# Patient Record
Sex: FEMALE | Race: BLACK OR AFRICAN AMERICAN | NOT HISPANIC OR LATINO | URBAN - METROPOLITAN AREA
[De-identification: names, ages, dates, MRNs, and addresses within clinical notes are randomized per-mention and may not be internally consistent; named-entity substitution may affect disease eponyms.]

---

## 2020-08-06 ENCOUNTER — INPATIENT (INPATIENT)
Facility: HOSPITAL | Age: 35
LOS: 0 days | Discharge: HOME | End: 2020-08-07
Attending: SPECIALIST | Admitting: SPECIALIST
Payer: MEDICAID

## 2020-08-06 VITALS
SYSTOLIC BLOOD PRESSURE: 173 MMHG | DIASTOLIC BLOOD PRESSURE: 101 MMHG | RESPIRATION RATE: 16 BRPM | TEMPERATURE: 99 F | HEART RATE: 99 BPM

## 2020-08-06 DIAGNOSIS — Z3A.00 WEEKS OF GESTATION OF PREGNANCY NOT SPECIFIED: ICD-10-CM

## 2020-08-06 DIAGNOSIS — O16.3 UNSPECIFIED MATERNAL HYPERTENSION, THIRD TRIMESTER: ICD-10-CM

## 2020-08-06 DIAGNOSIS — Z98.890 OTHER SPECIFIED POSTPROCEDURAL STATES: Chronic | ICD-10-CM

## 2020-08-06 DIAGNOSIS — O26.899 OTHER SPECIFIED PREGNANCY RELATED CONDITIONS, UNSPECIFIED TRIMESTER: ICD-10-CM

## 2020-08-06 LAB
ALBUMIN SERPL ELPH-MCNC: 4 G/DL — SIGNIFICANT CHANGE UP (ref 3.3–5)
ALP SERPL-CCNC: 121 U/L — HIGH (ref 40–120)
ALT FLD-CCNC: 7 U/L — SIGNIFICANT CHANGE UP (ref 4–33)
AMPHET UR-MCNC: NEGATIVE — SIGNIFICANT CHANGE UP
ANION GAP SERPL CALC-SCNC: 15 MMO/L — HIGH (ref 7–14)
APTT BLD: 24.8 SEC — LOW (ref 27–36.3)
AST SERPL-CCNC: 10 U/L — SIGNIFICANT CHANGE UP (ref 4–32)
BARBITURATES UR SCN-MCNC: NEGATIVE — SIGNIFICANT CHANGE UP
BASOPHILS # BLD AUTO: 0.02 K/UL — SIGNIFICANT CHANGE UP (ref 0–0.2)
BASOPHILS NFR BLD AUTO: 0.2 % — SIGNIFICANT CHANGE UP (ref 0–2)
BENZODIAZ UR-MCNC: NEGATIVE — SIGNIFICANT CHANGE UP
BILIRUB SERPL-MCNC: < 0.2 MG/DL — LOW (ref 0.2–1.2)
BLD GP AB SCN SERPL QL: NEGATIVE — SIGNIFICANT CHANGE UP
BUN SERPL-MCNC: 9 MG/DL — SIGNIFICANT CHANGE UP (ref 7–23)
CALCIUM SERPL-MCNC: 8.9 MG/DL — SIGNIFICANT CHANGE UP (ref 8.4–10.5)
CANNABINOIDS UR-MCNC: NEGATIVE — SIGNIFICANT CHANGE UP
CHLORIDE SERPL-SCNC: 104 MMOL/L — SIGNIFICANT CHANGE UP (ref 98–107)
CO2 SERPL-SCNC: 18 MMOL/L — LOW (ref 22–31)
COCAINE METAB.OTHER UR-MCNC: NEGATIVE — SIGNIFICANT CHANGE UP
CREAT SERPL-MCNC: 0.5 MG/DL — SIGNIFICANT CHANGE UP (ref 0.5–1.3)
EOSINOPHIL # BLD AUTO: 0.07 K/UL — SIGNIFICANT CHANGE UP (ref 0–0.5)
EOSINOPHIL NFR BLD AUTO: 0.7 % — SIGNIFICANT CHANGE UP (ref 0–6)
FIBRINOGEN PPP-MCNC: 667 MG/DL — HIGH (ref 290–520)
GLUCOSE SERPL-MCNC: 95 MG/DL — SIGNIFICANT CHANGE UP (ref 70–99)
HBV SURFACE AG SER-ACNC: NEGATIVE — SIGNIFICANT CHANGE UP
HCT VFR BLD CALC: 30.6 % — LOW (ref 34.5–45)
HGB BLD-MCNC: 10.2 G/DL — LOW (ref 11.5–15.5)
HIV COMBO RESULT: SIGNIFICANT CHANGE UP
HIV1+2 AB SPEC QL: SIGNIFICANT CHANGE UP
IMM GRANULOCYTES NFR BLD AUTO: 0.4 % — SIGNIFICANT CHANGE UP (ref 0–1.5)
INR BLD: 0.98 — SIGNIFICANT CHANGE UP (ref 0.88–1.16)
LDH SERPL L TO P-CCNC: 135 U/L — SIGNIFICANT CHANGE UP (ref 135–225)
LYMPHOCYTES # BLD AUTO: 1.98 K/UL — SIGNIFICANT CHANGE UP (ref 1–3.3)
LYMPHOCYTES # BLD AUTO: 20.6 % — SIGNIFICANT CHANGE UP (ref 13–44)
MCHC RBC-ENTMCNC: 31 PG — SIGNIFICANT CHANGE UP (ref 27–34)
MCHC RBC-ENTMCNC: 33.3 % — SIGNIFICANT CHANGE UP (ref 32–36)
MCV RBC AUTO: 93 FL — SIGNIFICANT CHANGE UP (ref 80–100)
METHADONE UR-MCNC: NEGATIVE — SIGNIFICANT CHANGE UP
MONOCYTES # BLD AUTO: 0.66 K/UL — SIGNIFICANT CHANGE UP (ref 0–0.9)
MONOCYTES NFR BLD AUTO: 6.9 % — SIGNIFICANT CHANGE UP (ref 2–14)
NEUTROPHILS # BLD AUTO: 6.85 K/UL — SIGNIFICANT CHANGE UP (ref 1.8–7.4)
NEUTROPHILS NFR BLD AUTO: 71.2 % — SIGNIFICANT CHANGE UP (ref 43–77)
NRBC # FLD: 0 K/UL — SIGNIFICANT CHANGE UP (ref 0–0)
OPIATES UR-MCNC: NEGATIVE — SIGNIFICANT CHANGE UP
OXYCODONE UR-MCNC: NEGATIVE — SIGNIFICANT CHANGE UP
PCP UR-MCNC: NEGATIVE — SIGNIFICANT CHANGE UP
PLATELET # BLD AUTO: 269 K/UL — SIGNIFICANT CHANGE UP (ref 150–400)
PMV BLD: 9 FL — SIGNIFICANT CHANGE UP (ref 7–13)
POTASSIUM SERPL-MCNC: 3.9 MMOL/L — SIGNIFICANT CHANGE UP (ref 3.5–5.3)
POTASSIUM SERPL-SCNC: 3.9 MMOL/L — SIGNIFICANT CHANGE UP (ref 3.5–5.3)
PROT SERPL-MCNC: 7.2 G/DL — SIGNIFICANT CHANGE UP (ref 6–8.3)
PROTHROM AB SERPL-ACNC: 11.2 SEC — SIGNIFICANT CHANGE UP (ref 10.6–13.6)
RBC # BLD: 3.29 M/UL — LOW (ref 3.8–5.2)
RBC # FLD: 14.1 % — SIGNIFICANT CHANGE UP (ref 10.3–14.5)
RH IG SCN BLD-IMP: POSITIVE — SIGNIFICANT CHANGE UP
RH IG SCN BLD-IMP: POSITIVE — SIGNIFICANT CHANGE UP
RUBV IGG SER-ACNC: 9.2 INDEX — SIGNIFICANT CHANGE UP
RUBV IGG SER-IMP: POSITIVE — SIGNIFICANT CHANGE UP
SARS-COV-2 RNA SPEC QL NAA+PROBE: SIGNIFICANT CHANGE UP
SODIUM SERPL-SCNC: 137 MMOL/L — SIGNIFICANT CHANGE UP (ref 135–145)
T PALLIDUM AB TITR SER: NEGATIVE — SIGNIFICANT CHANGE UP
URATE SERPL-MCNC: 5.6 MG/DL — SIGNIFICANT CHANGE UP (ref 2.5–7)
WBC # BLD: 9.62 K/UL — SIGNIFICANT CHANGE UP (ref 3.8–10.5)
WBC # FLD AUTO: 9.62 K/UL — SIGNIFICANT CHANGE UP (ref 3.8–10.5)

## 2020-08-06 PROCEDURE — 59409 OBSTETRICAL CARE: CPT | Mod: U9

## 2020-08-06 RX ORDER — OXYCODONE HYDROCHLORIDE 5 MG/1
5 TABLET ORAL
Refills: 0 | Status: DISCONTINUED | OUTPATIENT
Start: 2020-08-06 | End: 2020-08-07

## 2020-08-06 RX ORDER — OXYCODONE HYDROCHLORIDE 5 MG/1
5 TABLET ORAL ONCE
Refills: 0 | Status: DISCONTINUED | OUTPATIENT
Start: 2020-08-06 | End: 2020-08-07

## 2020-08-06 RX ORDER — DIPHENHYDRAMINE HCL 50 MG
25 CAPSULE ORAL EVERY 6 HOURS
Refills: 0 | Status: DISCONTINUED | OUTPATIENT
Start: 2020-08-06 | End: 2020-08-07

## 2020-08-06 RX ORDER — IBUPROFEN 200 MG
600 TABLET ORAL EVERY 6 HOURS
Refills: 0 | Status: COMPLETED | OUTPATIENT
Start: 2020-08-06 | End: 2021-07-05

## 2020-08-06 RX ORDER — ACETAMINOPHEN 500 MG
975 TABLET ORAL
Refills: 0 | Status: DISCONTINUED | OUTPATIENT
Start: 2020-08-06 | End: 2020-08-07

## 2020-08-06 RX ORDER — TETANUS TOXOID, REDUCED DIPHTHERIA TOXOID AND ACELLULAR PERTUSSIS VACCINE, ADSORBED 5; 2.5; 8; 8; 2.5 [IU]/.5ML; [IU]/.5ML; UG/.5ML; UG/.5ML; UG/.5ML
0.5 SUSPENSION INTRAMUSCULAR ONCE
Refills: 0 | Status: DISCONTINUED | OUTPATIENT
Start: 2020-08-06 | End: 2020-08-07

## 2020-08-06 RX ORDER — MAGNESIUM HYDROXIDE 400 MG/1
30 TABLET, CHEWABLE ORAL
Refills: 0 | Status: DISCONTINUED | OUTPATIENT
Start: 2020-08-06 | End: 2020-08-07

## 2020-08-06 RX ORDER — KETOROLAC TROMETHAMINE 30 MG/ML
30 SYRINGE (ML) INJECTION ONCE
Refills: 0 | Status: DISCONTINUED | OUTPATIENT
Start: 2020-08-06 | End: 2020-08-06

## 2020-08-06 RX ORDER — BENZOCAINE 10 %
1 GEL (GRAM) MUCOUS MEMBRANE EVERY 6 HOURS
Refills: 0 | Status: DISCONTINUED | OUTPATIENT
Start: 2020-08-06 | End: 2020-08-07

## 2020-08-06 RX ORDER — SIMETHICONE 80 MG/1
80 TABLET, CHEWABLE ORAL EVERY 4 HOURS
Refills: 0 | Status: DISCONTINUED | OUTPATIENT
Start: 2020-08-06 | End: 2020-08-07

## 2020-08-06 RX ORDER — DIBUCAINE 1 %
1 OINTMENT (GRAM) RECTAL EVERY 6 HOURS
Refills: 0 | Status: DISCONTINUED | OUTPATIENT
Start: 2020-08-06 | End: 2020-08-07

## 2020-08-06 RX ORDER — SODIUM CHLORIDE 9 MG/ML
3 INJECTION INTRAMUSCULAR; INTRAVENOUS; SUBCUTANEOUS EVERY 8 HOURS
Refills: 0 | Status: DISCONTINUED | OUTPATIENT
Start: 2020-08-06 | End: 2020-08-07

## 2020-08-06 RX ORDER — OXYTOCIN 10 UNIT/ML
333.33 VIAL (ML) INJECTION
Qty: 20 | Refills: 0 | Status: DISCONTINUED | OUTPATIENT
Start: 2020-08-06 | End: 2020-08-07

## 2020-08-06 RX ORDER — NIFEDIPINE 30 MG
30 TABLET, EXTENDED RELEASE 24 HR ORAL DAILY
Refills: 0 | Status: DISCONTINUED | OUTPATIENT
Start: 2020-08-06 | End: 2020-08-07

## 2020-08-06 RX ORDER — HYDROCORTISONE 1 %
1 OINTMENT (GRAM) TOPICAL EVERY 6 HOURS
Refills: 0 | Status: DISCONTINUED | OUTPATIENT
Start: 2020-08-06 | End: 2020-08-07

## 2020-08-06 RX ORDER — OXYTOCIN 10 UNIT/ML
333.33 VIAL (ML) INJECTION
Qty: 20 | Refills: 0 | Status: DISCONTINUED | OUTPATIENT
Start: 2020-08-06 | End: 2020-08-06

## 2020-08-06 RX ORDER — PRAMOXINE HYDROCHLORIDE 150 MG/15G
1 AEROSOL, FOAM RECTAL EVERY 4 HOURS
Refills: 0 | Status: DISCONTINUED | OUTPATIENT
Start: 2020-08-06 | End: 2020-08-07

## 2020-08-06 RX ORDER — LANOLIN
1 OINTMENT (GRAM) TOPICAL EVERY 6 HOURS
Refills: 0 | Status: DISCONTINUED | OUTPATIENT
Start: 2020-08-06 | End: 2020-08-07

## 2020-08-06 RX ORDER — AER TRAVELER 0.5 G/1
1 SOLUTION RECTAL; TOPICAL EVERY 4 HOURS
Refills: 0 | Status: DISCONTINUED | OUTPATIENT
Start: 2020-08-06 | End: 2020-08-07

## 2020-08-06 RX ORDER — LABETALOL HCL 100 MG
20 TABLET ORAL ONCE
Refills: 0 | Status: DISCONTINUED | OUTPATIENT
Start: 2020-08-06 | End: 2020-08-06

## 2020-08-06 RX ORDER — SODIUM CHLORIDE 9 MG/ML
1000 INJECTION, SOLUTION INTRAVENOUS
Refills: 0 | Status: DISCONTINUED | OUTPATIENT
Start: 2020-08-06 | End: 2020-08-06

## 2020-08-06 RX ADMIN — Medication 30 MILLIGRAM(S): at 09:52

## 2020-08-06 RX ADMIN — SODIUM CHLORIDE 125 MILLILITER(S): 9 INJECTION, SOLUTION INTRAVENOUS at 09:53

## 2020-08-06 RX ADMIN — Medication 30 MILLIGRAM(S): at 17:06

## 2020-08-06 NOTE — OB RN PATIENT PROFILE - WEIGHT: PREPREGNANCY IN LBS
"Initial Temp 97.8  F (36.6  C) (Tympanic)  Ht 2' 8.75\" (0.832 m)  Wt 25 lb 13.5 oz (11.7 kg)  HC 19\" (48.3 cm)  BMI 16.94 kg/m2 Estimated body mass index is 16.94 kg/(m^2) as calculated from the following:    Height as of this encounter: 2' 8.75\" (0.832 m).    Weight as of this encounter: 25 lb 13.5 oz (11.7 kg). .    Kaylyn Ang, JAROD    " 160

## 2020-08-06 NOTE — OB PROVIDER H&P - ALERT: PERTINENT HISTORY
Fetal Non-Stress Test (NST)/Follow up Sonogram for Growth/1st Trimester Sonogram/20 Week Level II Sonogram

## 2020-08-06 NOTE — OB RN TRIAGE NOTE - GRAVIDA, OB PROFILE
Notified patient that script is ready to  at [facility].  
Ok to refill  
Patient at [facility], [place].  Script not found.  Patient spoke with Peterson and was informed she will call him when it is ready.  
Patient picked up: Script.   Identity was verified: Yes.     
Patient stated he will be at the clinic tomorrow at 9:30 am. He would like to  the prescription at that time.   
Patient will come to [facility] [place] office to : prescription.   Patient was advised of location and hours: Yes.   Patient was advised to bring photo identification: Yes.   Patient elects another party to  item: no.    Patient stated that he has 5 days worth and he always has to  the prescription. Please call once ready to .     
Refill request for oxycodone.  Last seen 4/1/19;  Last filled 8/20/19.     PDMP reviewed and shows med was last dispensed on 8/20/19. No suspicious behavior found. Routed to Keri to advise please.      
Rx was printed on 9/23 at 4:57, but no Rx found by computer or out at the .    Peterson or Dr. Saavedra do you know where script is?  
Script ready  
4

## 2020-08-06 NOTE — OB RN DELIVERY SUMMARY - NS_SEPSISRSKCALC_OBGYN_ALL_OB_FT
EOS calculated successfully. EOS Risk Factor: 0.5/1000 live births ([facility] national incidence); GA=39w3d; Temp=99; ROM=0.933; GBS='Unknown'; Antibiotics='No antibiotics or any antibiotics < 2 hrs prior to birth'

## 2020-08-06 NOTE — OB PROVIDER TRIAGE NOTE - NSHPPHYSICALEXAM_GEN_ALL_CORE
Vital Signs Last 24 Hrs  T(C): 37.2 (06 Aug 2020 08:45), Max: 37.2 (06 Aug 2020 08:45)  T(F): 99 (06 Aug 2020 08:45), Max: 99 (06 Aug 2020 08:45)  HR: 88 (06 Aug 2020 09:00) (88 - 99)  BP: 152/90 (06 Aug 2020 09:00) (152/90 - 173/101)  BP(mean): --  RR: 16 (06 Aug 2020 08:45) (16 - 16)  SpO2: 100% (06 Aug 2020 09:03) (99% - 100%)    A&O x 3  CTAB  normal RRR  abdomen: gravid, soft, nontender, good relaxation between contractions  sve 6/100/-2 bulging bag  TAS: vtx confirmed

## 2020-08-06 NOTE — CHART NOTE - NSCHARTNOTEFT_GEN_A_CORE
R1 Progress Note    Patient seen and examined at bedside for AROM. Clear fluid.     NAD  Vital Signs Last 24 Hrs  T(C): 37.0 (06 Aug 2020 11:04), Max: 37.2 (06 Aug 2020 08:45)  T(F): 98.6 (06 Aug 2020 11:04), Max: 99 (06 Aug 2020 08:45)  HR: 109 (06 Aug 2020 13:23) (65 - 111)  BP: 177/84 (06 Aug 2020 13:23) (126/89 - 177/84)  BP(mean): --  RR: 16 (06 Aug 2020 09:14) (16 - 16)  SpO2: 99% (06 Aug 2020 09:18) (97% - 100%)    SVE: 8/100/-1  EFM: cat I   TOCO: q2    A/P 34y   - labor: expectant management  - fetus: cat 1 FHT  - gbs: neg  - pain: no complaints    d/w Dr. Rowan, PGY4  Rosalind Reis MD PGY1

## 2020-08-06 NOTE — OB PROVIDER DELIVERY SUMMARY - NSPROVIDERDELIVERYNOTE_OBGYN_ALL_OB_FT
Spontaneous vaginal delivery of liveborn female infant from JOE position. Head, shoulders, and body delivered easily. Infant was suctioned. No mec. Delayed cord clamping.  Cord was clamped and cut. Placenta delivered intact with a 3 vessel cord. Infant handed to mom. Fundal massage was given and uterine fundus was found to be firm. Vaginal exam revealed an intact cervix, vaginal walls and sulci. Patient had a 2nd degree laceration in the perineum that was repaired with 2.0 chromic suture. Excellent hemostasis was noted. Patient was stable and went to recovery. Count was correct x 2.

## 2020-08-06 NOTE — OB PROVIDER H&P - HISTORY OF PRESENT ILLNESS
This is a 34 year old  patient of [facility]  at 39.3 weeks gestational age presents with complaints of contractions q 1.5-5 minutes. Does not require pain management at this time. Reports +GFM, denies LOF, VB. denies headache, blurry vision, epigastric pain, nausea, vomiting  AP course complicated by:  - chtn diagnosed at 5 months GA, on procardia 30 mg/ daily, last dose 830a yesterday    GBS neg as per patient    med: chtn  surg: D&C  GYN: fibroids  OB:  2004 ft  complicated by chtn 7#4  2011 ft  complicated by chtn 6#7  top x1 D&C  NKDA  current meds: pnv, procardia 30 mg daily

## 2020-08-06 NOTE — OB PROVIDER H&P - PRO HIV INFANT
Agree w History and Physical, History of Present Illness, Allergies/Medications, Patient History, Risk Assessment, Physical Exam, Labs and Results, Assessment and Plan
unknown

## 2020-08-06 NOTE — OB PROVIDER TRIAGE NOTE - NSOBPROVIDERNOTE_OBGYN_ALL_OB_FT
This is a 34 year old  at 39.3 weeks gestational age admitted for labor and chtn/ r.o PEC    plan discussed with dr grayson, dr levine  admit for labor  routine orders + expedited labs  COVID 19 swab  HELLP labs  Continue procardia 30 mg po daily  approved for epidural prn  expectant management

## 2020-08-06 NOTE — OB RN PATIENT PROFILE - PSH
· 2 RN skin check complete with HEBERT Franklin.  · Devices in place Silicone nasal cannula.  · Skin assessed under devices assessed.  · Confirmed pressure ulcers found on n/a.  · New potential pressure ulcers noted on n/a.   · The following interventions in place grey foam pads, moisturizer on feet    BL feet/heels dry/caloused/flaky     History of D&C  x1

## 2020-08-06 NOTE — OB PROVIDER TRIAGE NOTE - NSHPLABSRESULTS_GEN_ALL_CORE
This is a 34 year old  at 39.3 weeks gestational age admitted for labor and chtn/ r.o PEC    plan discussed with dr muhammad for labor  routine orders + expidited labs  COVID 19 swab  HELLP labs  Continue procardia 30 mg po daily  approved for epidural prn  expectant management

## 2020-08-06 NOTE — OB RN TRIAGE NOTE - PMH
Vaginal delivery   04 7#4 CHTN    11 6#7 CHTN Termination of pregnancy (fetus)  X1  Vaginal delivery   04 7#4 CHTN    11 6#7 CHTN

## 2020-08-06 NOTE — OB RN PATIENT PROFILE - HEIGHT IN CM
Telephone Encounter by Rena Nelson [phone]

## 2020-08-07 ENCOUNTER — TRANSCRIPTION ENCOUNTER (OUTPATIENT)
Age: 35
End: 2020-08-07

## 2020-08-07 VITALS
TEMPERATURE: 98 F | RESPIRATION RATE: 18 BRPM | DIASTOLIC BLOOD PRESSURE: 75 MMHG | OXYGEN SATURATION: 100 % | SYSTOLIC BLOOD PRESSURE: 120 MMHG | HEART RATE: 74 BPM

## 2020-08-07 RX ORDER — IBUPROFEN 200 MG
600 TABLET ORAL EVERY 6 HOURS
Refills: 0 | Status: DISCONTINUED | OUTPATIENT
Start: 2020-08-07 | End: 2020-08-07

## 2020-08-07 RX ORDER — NIFEDIPINE 30 MG
1 TABLET, EXTENDED RELEASE 24 HR ORAL
Qty: 0 | Refills: 0 | DISCHARGE
Start: 2020-08-07

## 2020-08-07 RX ORDER — NIFEDIPINE 30 MG
1 TABLET, EXTENDED RELEASE 24 HR ORAL
Qty: 60 | Refills: 1
Start: 2020-08-07 | End: 2020-12-04

## 2020-08-07 RX ORDER — NIFEDIPINE 30 MG
0 TABLET, EXTENDED RELEASE 24 HR ORAL
Qty: 0 | Refills: 0 | DISCHARGE

## 2020-08-07 RX ORDER — ACETAMINOPHEN 500 MG
3 TABLET ORAL
Qty: 0 | Refills: 0 | DISCHARGE
Start: 2020-08-07

## 2020-08-07 RX ADMIN — SODIUM CHLORIDE 3 MILLILITER(S): 9 INJECTION INTRAMUSCULAR; INTRAVENOUS; SUBCUTANEOUS at 06:07

## 2020-08-07 RX ADMIN — Medication 975 MILLIGRAM(S): at 00:31

## 2020-08-07 RX ADMIN — Medication 30 MILLIGRAM(S): at 09:28

## 2020-08-07 RX ADMIN — Medication 975 MILLIGRAM(S): at 01:00

## 2020-08-07 NOTE — DISCHARGE NOTE OB - PATIENT PORTAL LINK FT
You can access the FollowMyHealth Patient Portal offered by [facility] by registering at the following website: [email or web address]. By joining [facility]’s FollowMyHealth portal, you will also be able to view your health information using other applications (apps) compatible with our system.

## 2020-08-07 NOTE — DISCHARGE NOTE OB - MEDICATION SUMMARY - MEDICATIONS TO TAKE
I will START or STAY ON the medications listed below when I get home from the hospital:    BP cuff  -- Apply on skin to affected area 3 times a day   -- Indication: For BP monitoring    acetaminophen 325 mg oral tablet  -- 3 tab(s) by mouth , As Needed  -- Indication: For Pain     Prenatal Multivitamins with Folic Acid 1 mg oral tablet  -- 1 tab(s) by mouth once a day  -- Indication: For Vitamins I will START or STAY ON the medications listed below when I get home from the hospital:    BP cuff  -- Apply on skin to affected area 3 times a day   -- Indication: For BP    acetaminophen 325 mg oral tablet  -- 3 tab(s) by mouth , As Needed  -- Indication: For Pain     Prenatal Multivitamins with Folic Acid 1 mg oral tablet  -- 1 tab(s) by mouth once a day  -- Indication: For Vitamins I will START or STAY ON the medications listed below when I get home from the hospital:    BP cuff  -- Apply on skin to affected area 3 times a day   -- Indication: For BP    acetaminophen 325 mg oral tablet  -- 3 tab(s) by mouth , As Needed  -- Indication: For Pain     NIFEdipine 30 mg oral tablet, extended release  -- 1 tab(s) by mouth once a day  -- Indication: For Hypertension affecting pregnancy in third trimester    Prenatal Multivitamins with Folic Acid 1 mg oral tablet  -- 1 tab(s) by mouth once a day  -- Indication: For Vitamins

## 2020-08-07 NOTE — PROGRESS NOTE ADULT - ATTENDING COMMENTS
I examined and evaluated the patient  I discussed the patient with the resident and agree with the resident's documentation with the following edits and additions:    33yo s/p NVD. PPD 1  H/o cHTN  Patient denies HA, blurry vision, RUQ pain  BPs controlled on Procardia 30mg XL    -Continue to monitor BPS  If remains stable can be discharged 24hours post delivery    JAZZ Teixeira MD, TIA, FACOG

## 2020-08-07 NOTE — DISCHARGE NOTE OB - COMMUNITY RESOURCE NAME:
Patient she will call to schedule postpartum follow up appointment for 4 to 6 weeks after delivery date at [facility] OB clinic [phone] Patient will call to schedule postpartum follow up appointment for 4 to 6 weeks after delivery date at [facility] OB clinic [phone]

## 2020-08-07 NOTE — DISCHARGE NOTE OB - MEDICATION SUMMARY - MEDICATIONS TO STOP TAKING
I will STOP taking the medications listed below when I get home from the hospital:  None I will STOP taking the medications listed below when I get home from the hospital:    Prenatal 1

## 2020-08-07 NOTE — LACTATION INITIAL EVALUATION - LACTATION INTERVENTIONS
initiate hand expression routine/initiate skin to skin/Instructed and assisted with positioning to facilitate proper latch.  Encouraged to feed on cue and follow the feeding log, reviewed.  Taught hand expression.  Discussed outpatient resources available, warm line, virtual breastfeeding support group.

## 2020-08-07 NOTE — DISCHARGE NOTE OB - CARE PROVIDER_API CALL
[facility], Oncology Basement  [address] [place] [address]  Phone: [phone]  Fax: (   )    -  Follow Up Time:

## 2020-08-07 NOTE — DISCHARGE NOTE OB - HOSPITAL COURSE
34y who experienced  c/b cHTN on Procardia 30. Labor course was uncomplicated & delivery was uncomplicated. Postpartum course was unremarkable. Patient was transferred to postpartum floor & monitored. Pt was voiding spontaneously with normal vital signs. Patient is medically optimized for discharge & instructed to follow up with [facility] in 1 week for BP check and in 6 weeks for postpartum care.

## 2020-08-07 NOTE — PROGRESS NOTE ADULT - ASSESSMENT
35y/o PPD#1 from  c/b cHTN on Procardia 30 in stable condition. Pain is well controlled and pt is doing well.

## 2020-08-07 NOTE — PROGRESS NOTE ADULT - PROBLEM SELECTOR PLAN 1
- Continue with po analgesia, pain well controlled  - Increase ambulation, SCDs when not ambulating  - Continue regular diet  - No evidence of ongoing bleeding    Jeaneth Graves, PGY1

## 2020-08-07 NOTE — DISCHARGE NOTE OB - PLAN OF CARE
Full recovery 35yo PPD1  with cHTN stable in the postpartum period for discharge 35yo PPD1  with cHTN stable in the postpartum period for discharge  Follow up with your OBGYN within 1-2 days for a BP check.  Check your BP at home 2x/day.  Take your BP medication as prescribed.  Call your OBGYN with any BP >150/100.  Call your OBGYN with any severe headache, blurry vision or inability to tolerate PO intake.

## 2020-08-07 NOTE — DISCHARGE NOTE OB - MEDICATION SUMMARY - MEDICATIONS TO CHANGE
I will SWITCH the dose or number of times a day I take the medications listed below when I get home from the hospital:  None I will SWITCH the dose or number of times a day I take the medications listed below when I get home from the hospital:    NIFEdipine 30 mg oral tablet, extended release

## 2020-08-07 NOTE — DISCHARGE NOTE OB - ADDITIONAL INSTRUCTIONS
Instructions:  Make your follow-up appointment with your doctor as ordered.   No heavy lifting, driving, or strenuous activity for 6 weeks.   Nothing per vagina such as tampons, intercourse, douches or tub baths for 6 weeks or until you see your doctor.   Call your doctor with any signs and symptoms of infection such as fever, chills, nausea or vomiting. Call your doctor with redness or swelling at the incision site, fluid leakage or wound separation. Call your doctor if you're unable to tolerate food, increase in vaginal bleeding, or have difficulty urinating. Call your doctor if you have pain that is not relieved by your prescribed medications. Notify your doctor with any of concerns.    HTN  Given a prescription for a blood pressure cuff to monitor your blood pressure at home. Call your doctor if your blood pressure is greater than or equal to 160 systolic (top number) or 110 diastolic (bottom number), or you experience a headache unrelieved by OTC medications, blurred vision, or difficulty breathing.    Follow up:      Please f/u in 2 weeks a postpartum appointment in 4-6 weeks @ the [facility] located  Please call the office for an appointment [phone] Instructions:  Make your follow-up appointment with your doctor as ordered.   No heavy lifting, driving, or strenuous activity for 6 weeks.   Nothing per vagina such as tampons, intercourse, douches or tub baths for 6 weeks or until you see your doctor.   Call your doctor with any signs and symptoms of infection such as fever, chills, nausea or vomiting. Call your doctor with redness or swelling at the incision site, fluid leakage or wound separation. Call your doctor if you're unable to tolerate food, increase in vaginal bleeding, or have difficulty urinating. Call your doctor if you have pain that is not relieved by your prescribed medications. Notify your doctor with any of concerns.    HTN  Given a prescription for a blood pressure cuff to monitor your blood pressure at home. Call your doctor if your blood pressure is greater than or equal to 150 systolic (top number) or 100 diastolic (bottom number), or you experience a headache unrelieved by OTC medications, blurred vision, or difficulty breathing.    Follow up:  Please f/u 48h with OB for BP check and in 6 weeks for a postpartum appointment @ the [facility] located at [address] [place] [address].  Please call the office for an appointment [phone]

## 2020-08-07 NOTE — DISCHARGE NOTE OB - CARE PLAN
Principal Discharge DX:	Vaginal delivery  Goal:	Full recovery  Assessment and plan of treatment:	33yo PPD1  with cHTN stable in the postpartum period for discharge Principal Discharge DX:	Vaginal delivery  Goal:	Full recovery  Assessment and plan of treatment:	33yo PPD1  with cHTN stable in the postpartum period for discharge  Follow up with your OBGYN within 1-2 days for a BP check.  Check your BP at home 2x/day.  Take your BP medication as prescribed.  Call your OBGYN with any BP >150/100.  Call your OBGYN with any severe headache, blurry vision or inability to tolerate PO intake.

## 2020-08-07 NOTE — DISCHARGE NOTE OB - COMMUNITY RESOURCE CONTACT NUMBER:
Patient will call to schedule baby's first visit appointment at  [facility]: Division of General Pediatrics [address], [address] [place] [address]([phone]) so that baby is evaluated by pediatrician 1 to 2 days after hospital discharge.

## 2020-08-08 ENCOUNTER — TRANSCRIPTION ENCOUNTER (OUTPATIENT)
Age: 35
End: 2020-08-08

## 2020-08-10 PROBLEM — Z00.00 ENCOUNTER FOR PREVENTIVE HEALTH EXAMINATION: Status: ACTIVE | Noted: 2020-08-10

## 2020-08-13 PROBLEM — Z33.2 ENCOUNTER FOR ELECTIVE TERMINATION OF PREGNANCY: Chronic | Status: ACTIVE | Noted: 2020-08-06

## 2020-08-18 ENCOUNTER — APPOINTMENT (OUTPATIENT)
Dept: OBGYN | Facility: HOSPITAL | Age: 35
End: 2020-08-18

## 2020-08-21 ENCOUNTER — TRANSCRIPTION ENCOUNTER (OUTPATIENT)
Age: 35
End: 2020-08-21

## 2020-08-25 ENCOUNTER — APPOINTMENT (OUTPATIENT)
Dept: OBGYN | Facility: HOSPITAL | Age: 35
End: 2020-08-25

## 2020-08-25 ENCOUNTER — NON-APPOINTMENT (OUTPATIENT)
Age: 35
End: 2020-08-25

## 2020-09-01 ENCOUNTER — NON-APPOINTMENT (OUTPATIENT)
Age: 35
End: 2020-09-01

## 2020-09-04 ENCOUNTER — NON-APPOINTMENT (OUTPATIENT)
Age: 35
End: 2020-09-04

## 2020-10-06 ENCOUNTER — TRANSCRIPTION ENCOUNTER (OUTPATIENT)
Age: 35
End: 2020-10-06

## 2020-10-06 ENCOUNTER — APPOINTMENT (OUTPATIENT)
Dept: OBGYN | Facility: HOSPITAL | Age: 35
End: 2020-10-06

## 2020-10-06 ENCOUNTER — OUTPATIENT (OUTPATIENT)
Dept: OUTPATIENT SERVICES | Facility: HOSPITAL | Age: 35
LOS: 1 days | End: 2020-10-06

## 2020-10-06 VITALS
WEIGHT: 193 LBS | TEMPERATURE: 97.9 F | BODY MASS INDEX: 29.25 KG/M2 | HEART RATE: 100 BPM | DIASTOLIC BLOOD PRESSURE: 110 MMHG | HEIGHT: 68 IN | SYSTOLIC BLOOD PRESSURE: 168 MMHG

## 2020-10-06 DIAGNOSIS — Z98.890 OTHER SPECIFIED POSTPROCEDURAL STATES: Chronic | ICD-10-CM

## 2020-10-06 RX ORDER — NIFEDIPINE 30 MG/1
30 TABLET, EXTENDED RELEASE ORAL DAILY
Qty: 14 | Refills: 0 | Status: COMPLETED | COMMUNITY
Start: 2020-10-06 | End: 2020-10-20

## 2020-10-06 NOTE — HISTORY OF PRESENT ILLNESS
[Postpartum Follow Up] : postpartum follow up [Complications:___] : complications include: [unfilled] [Delivery Date: ___] : on [unfilled] [] : delivered by vaginal delivery [Female] : Delivery History: baby girl [Wt. ___] : weighing [unfilled] [Rhogam] : Rhogam was not administered [Rubella Vaccine] : Rubella vaccine was not administered [Pertussis Vaccine] : Pertussis vaccine was not administered [BTL] : no tubal ligation [Breastfeeding] : currently nursing

## 2020-10-07 ENCOUNTER — NON-APPOINTMENT (OUTPATIENT)
Age: 35
End: 2020-10-07

## 2020-10-09 ENCOUNTER — APPOINTMENT (OUTPATIENT)
Dept: CARDIOLOGY | Facility: CLINIC | Age: 35
End: 2020-10-09

## 2020-10-09 ENCOUNTER — APPOINTMENT (OUTPATIENT)
Dept: OBGYN | Facility: HOSPITAL | Age: 35
End: 2020-10-09

## 2020-10-13 ENCOUNTER — NON-APPOINTMENT (OUTPATIENT)
Age: 35
End: 2020-10-13

## 2020-10-22 ENCOUNTER — NON-APPOINTMENT (OUTPATIENT)
Age: 35
End: 2020-10-22

## 2020-10-26 ENCOUNTER — NON-APPOINTMENT (OUTPATIENT)
Age: 35
End: 2020-10-26

## 2020-10-27 DIAGNOSIS — Z87.42 PERSONAL HISTORY OF OTHER DISEASES OF THE FEMALE GENITAL TRACT: ICD-10-CM

## 2020-10-27 RX ORDER — NIFEDIPINE 30 MG/1
30 TABLET, EXTENDED RELEASE ORAL DAILY
Refills: 0 | Status: ACTIVE | COMMUNITY

## 2020-10-27 RX ORDER — ELASTIC BANDAGE 2"X2.2YD
BANDAGE TOPICAL DAILY
Refills: 0 | Status: ACTIVE | COMMUNITY

## 2020-10-28 ENCOUNTER — NON-APPOINTMENT (OUTPATIENT)
Age: 35
End: 2020-10-28

## 2020-10-29 ENCOUNTER — RESULT REVIEW (OUTPATIENT)
Age: 35
End: 2020-10-29

## 2020-10-29 ENCOUNTER — LABORATORY RESULT (OUTPATIENT)
Age: 35
End: 2020-10-29

## 2020-10-29 ENCOUNTER — OUTPATIENT (OUTPATIENT)
Dept: OUTPATIENT SERVICES | Facility: HOSPITAL | Age: 35
LOS: 1 days | End: 2020-10-29

## 2020-10-29 ENCOUNTER — APPOINTMENT (OUTPATIENT)
Dept: OBGYN | Facility: HOSPITAL | Age: 35
End: 2020-10-29

## 2020-10-29 VITALS
DIASTOLIC BLOOD PRESSURE: 90 MMHG | HEIGHT: 68 IN | BODY MASS INDEX: 30.46 KG/M2 | HEART RATE: 105 BPM | WEIGHT: 201 LBS | SYSTOLIC BLOOD PRESSURE: 140 MMHG | TEMPERATURE: 98.7 F

## 2020-10-29 DIAGNOSIS — D25.9 LEIOMYOMA OF UTERUS, UNSPECIFIED: ICD-10-CM

## 2020-10-29 DIAGNOSIS — Z30.09 ENCOUNTER FOR OTHER GENERAL COUNSELING AND ADVICE ON CONTRACEPTION: ICD-10-CM

## 2020-10-29 DIAGNOSIS — I10 ESSENTIAL (PRIMARY) HYPERTENSION: ICD-10-CM

## 2020-10-29 DIAGNOSIS — Z98.890 OTHER SPECIFIED POSTPROCEDURAL STATES: Chronic | ICD-10-CM

## 2020-10-29 LAB
ALBUMIN SERPL ELPH-MCNC: 5 G/DL — SIGNIFICANT CHANGE UP (ref 3.3–5)
ALP SERPL-CCNC: 76 U/L — SIGNIFICANT CHANGE UP (ref 40–120)
ALT FLD-CCNC: 13 U/L — SIGNIFICANT CHANGE UP (ref 4–33)
ANION GAP SERPL CALC-SCNC: 13 MMO/L — SIGNIFICANT CHANGE UP (ref 7–14)
AST SERPL-CCNC: 13 U/L — SIGNIFICANT CHANGE UP (ref 4–32)
BASOPHILS # BLD AUTO: 0.03 K/UL — SIGNIFICANT CHANGE UP (ref 0–0.2)
BASOPHILS NFR BLD AUTO: 0.5 % — SIGNIFICANT CHANGE UP (ref 0–2)
BILIRUB SERPL-MCNC: < 0.2 MG/DL — LOW (ref 0.2–1.2)
BUN SERPL-MCNC: 15 MG/DL — SIGNIFICANT CHANGE UP (ref 7–23)
CALCIUM SERPL-MCNC: 9.8 MG/DL — SIGNIFICANT CHANGE UP (ref 8.4–10.5)
CHLORIDE SERPL-SCNC: 105 MMOL/L — SIGNIFICANT CHANGE UP (ref 98–107)
CO2 SERPL-SCNC: 23 MMOL/L — SIGNIFICANT CHANGE UP (ref 22–31)
CREAT SERPL-MCNC: 0.75 MG/DL — SIGNIFICANT CHANGE UP (ref 0.5–1.3)
EOSINOPHIL # BLD AUTO: 0.2 K/UL — SIGNIFICANT CHANGE UP (ref 0–0.5)
EOSINOPHIL NFR BLD AUTO: 3.1 % — SIGNIFICANT CHANGE UP (ref 0–6)
GLUCOSE SERPL-MCNC: 76 MG/DL — SIGNIFICANT CHANGE UP (ref 70–99)
HBA1C BLD-MCNC: 5.5 % — SIGNIFICANT CHANGE UP (ref 4–5.6)
HCT VFR BLD CALC: 42.1 % — SIGNIFICANT CHANGE UP (ref 34.5–45)
HGB BLD-MCNC: 13.1 G/DL — SIGNIFICANT CHANGE UP (ref 11.5–15.5)
IMM GRANULOCYTES NFR BLD AUTO: 0.2 % — SIGNIFICANT CHANGE UP (ref 0–1.5)
LYMPHOCYTES # BLD AUTO: 2.64 K/UL — SIGNIFICANT CHANGE UP (ref 1–3.3)
LYMPHOCYTES # BLD AUTO: 40.4 % — SIGNIFICANT CHANGE UP (ref 13–44)
MCHC RBC-ENTMCNC: 28.9 PG — SIGNIFICANT CHANGE UP (ref 27–34)
MCHC RBC-ENTMCNC: 31.1 % — LOW (ref 32–36)
MCV RBC AUTO: 92.9 FL — SIGNIFICANT CHANGE UP (ref 80–100)
MONOCYTES # BLD AUTO: 0.47 K/UL — SIGNIFICANT CHANGE UP (ref 0–0.9)
MONOCYTES NFR BLD AUTO: 7.2 % — SIGNIFICANT CHANGE UP (ref 2–14)
NEUTROPHILS # BLD AUTO: 3.18 K/UL — SIGNIFICANT CHANGE UP (ref 1.8–7.4)
NEUTROPHILS NFR BLD AUTO: 48.6 % — SIGNIFICANT CHANGE UP (ref 43–77)
NRBC # FLD: 0 K/UL — SIGNIFICANT CHANGE UP (ref 0–0)
PLATELET # BLD AUTO: 330 K/UL — SIGNIFICANT CHANGE UP (ref 150–400)
PMV BLD: 9.8 FL — SIGNIFICANT CHANGE UP (ref 7–13)
POTASSIUM SERPL-MCNC: 4.4 MMOL/L — SIGNIFICANT CHANGE UP (ref 3.5–5.3)
POTASSIUM SERPL-SCNC: 4.4 MMOL/L — SIGNIFICANT CHANGE UP (ref 3.5–5.3)
PROT SERPL-MCNC: 8.8 G/DL — HIGH (ref 6–8.3)
RBC # BLD: 4.53 M/UL — SIGNIFICANT CHANGE UP (ref 3.8–5.2)
RBC # FLD: 13.6 % — SIGNIFICANT CHANGE UP (ref 10.3–14.5)
SODIUM SERPL-SCNC: 141 MMOL/L — SIGNIFICANT CHANGE UP (ref 135–145)
WBC # BLD: 6.53 K/UL — SIGNIFICANT CHANGE UP (ref 3.8–10.5)
WBC # FLD AUTO: 6.53 K/UL — SIGNIFICANT CHANGE UP (ref 3.8–10.5)

## 2020-10-29 RX ORDER — NORETHINDRONE 0.35 MG/1
0.35 TABLET ORAL DAILY
Qty: 1 | Refills: 6 | Status: ACTIVE | COMMUNITY
Start: 2020-10-29 | End: 1900-01-01

## 2020-10-29 NOTE — HISTORY OF PRESENT ILLNESS
[Delivery Date: ___] : on [unfilled] [] : delivered by vaginal delivery [Female] : Delivery History: baby girl [Breastfeeding] : currently nursing [Discharge HCT: ___] : hematocrit level was [unfilled] [Discharge HGB: ___] : hemoglobin level was [unfilled] [Intended Contraception] : Intended Contraception: [Oral Contraceptives] : oral contraceptives [Back to Normal] : is back to normal in size [None] : no vaginal bleeding [Normal] : the vagina was normal [Healing Well] : is healing well [Cervix Sample Taken] : cervical sample taken for a Pap smear [Examination Of The Breasts] : breasts are normal [Doing Well] : is doing well [Rhogam] : Rhogam was not administered [Rubella Vaccine] : Rubella vaccine was not administered [Pertussis Vaccine] : Pertussis vaccine was not administered [BTL] : no tubal ligation [Resumed Menses] : has not resumed her menses [Resumed Paul] : has not resumed intercourse [FreeTextEntry9] : DARON @ [facility], Robin dx 10 year ago - was treated during her prengnacy with procardia 30xl and is still taking that dose. She reports not being followed by a PCP or cardiologist prior to the pregnancy and she was on no meds.  [de-identified] : Pelvic fullness noted at the right adnexa - 4x4, bulky and firm to palpation  [de-identified] : Presently living in shelter with her  and two children.  BP today 140/90 and taking procardia daily  [de-identified] : SW consult today, Cards appt scheduled for 11/2 patient reports having transportation set and urged of the importance of this visit. . BP cuff ordered to take BP daily. Procardia refill sent today. Annual pap, cultures, and labs completed today. Urine Ucg done neg. Patient counseled about BC options and interested in Oral contraception. Education provided and rx sent. RTC for contraceptive surveillance 6 months - condoms provided as well. Rx sent for pelvic sonogram to r/o fibroid or other pathology. Medical referral given for PCP establishment on a non urgent basis.

## 2020-10-30 DIAGNOSIS — Z30.09 ENCOUNTER FOR OTHER GENERAL COUNSELING AND ADVICE ON CONTRACEPTION: ICD-10-CM

## 2020-10-30 DIAGNOSIS — I10 ESSENTIAL (PRIMARY) HYPERTENSION: ICD-10-CM

## 2020-10-30 DIAGNOSIS — D25.9 LEIOMYOMA OF UTERUS, UNSPECIFIED: ICD-10-CM

## 2020-10-30 LAB
C TRACH RRNA SPEC QL NAA+PROBE: SIGNIFICANT CHANGE UP
HBV SURFACE AB SER-ACNC: REACTIVE — HIGH
HCV AB S/CO SERPL IA: 0.22 S/CO — SIGNIFICANT CHANGE UP (ref 0–0.99)
HCV AB SERPL-IMP: SIGNIFICANT CHANGE UP
HIV 1+2 AB+HIV1 P24 AG SERPL QL IA: SIGNIFICANT CHANGE UP
HPV HIGH+LOW RISK DNA PNL CVX: SIGNIFICANT CHANGE UP
N GONORRHOEA RRNA SPEC QL NAA+PROBE: SIGNIFICANT CHANGE UP
SPECIMEN SOURCE: SIGNIFICANT CHANGE UP
T PALLIDUM AB TITR SER: NEGATIVE — SIGNIFICANT CHANGE UP

## 2020-11-02 ENCOUNTER — APPOINTMENT (OUTPATIENT)
Dept: CARDIOLOGY | Facility: CLINIC | Age: 35
End: 2020-11-02
Payer: MEDICAID

## 2020-11-02 ENCOUNTER — NON-APPOINTMENT (OUTPATIENT)
Age: 35
End: 2020-11-02

## 2020-11-02 VITALS — WEIGHT: 196 LBS | BODY MASS INDEX: 29.8 KG/M2

## 2020-11-02 VITALS
HEART RATE: 135 BPM | TEMPERATURE: 98.1 F | DIASTOLIC BLOOD PRESSURE: 106 MMHG | OXYGEN SATURATION: 100 % | HEIGHT: 68 IN | SYSTOLIC BLOOD PRESSURE: 154 MMHG | BODY MASS INDEX: 29.8 KG/M2

## 2020-11-02 VITALS — HEART RATE: 131 BPM | OXYGEN SATURATION: 99 % | SYSTOLIC BLOOD PRESSURE: 144 MMHG | DIASTOLIC BLOOD PRESSURE: 91 MMHG

## 2020-11-02 DIAGNOSIS — T73.3XXS EXHAUSTION DUE TO EXCESSIVE EXERTION, SEQUELA: ICD-10-CM

## 2020-11-02 DIAGNOSIS — R06.02 SHORTNESS OF BREATH: ICD-10-CM

## 2020-11-02 DIAGNOSIS — I11.9 HYPERTENSIVE HEART DISEASE W/OUT HEART FAILURE: ICD-10-CM

## 2020-11-02 PROCEDURE — 99203 OFFICE O/P NEW LOW 30 MIN: CPT

## 2020-11-02 PROCEDURE — 93000 ELECTROCARDIOGRAM COMPLETE: CPT

## 2020-11-02 PROCEDURE — 99072 ADDL SUPL MATRL&STAF TM PHE: CPT

## 2020-11-02 NOTE — PHYSICAL EXAM
[General Appearance - Well Developed] : well developed [Normal Appearance] : normal appearance [General Appearance - Well Nourished] : well nourished [General Appearance - In No Acute Distress] : no acute distress [Normal Conjunctiva] : the conjunctiva exhibited no abnormalities [Normal Jugular Venous V Waves Present] : normal jugular venous V waves present [Heart Rate And Rhythm] : heart rate and rhythm were normal [Heart Sounds] : normal S1 and S2 [Arterial Pulses Normal] : the arterial pulses were normal [Edema] : no peripheral edema present [Systolic grade ___/6] : A grade [unfilled]/6 systolic murmur was heard. [Respiration, Rhythm And Depth] : normal respiratory rhythm and effort [Exaggerated Use Of Accessory Muscles For Inspiration] : no accessory muscle use [Auscultation Breath Sounds / Voice Sounds] : lungs were clear to auscultation bilaterally [Bowel Sounds] : normal bowel sounds [Abdomen Soft] : soft [Abnormal Walk] : normal gait [Nail Clubbing] : no clubbing of the fingernails [Cyanosis, Localized] : no localized cyanosis [Skin Turgor] : normal skin turgor [] : no rash [Oriented To Time, Place, And Person] : oriented to person, place, and time [Impaired Insight] : insight and judgment were intact [Affect] : the affect was normal

## 2020-11-02 NOTE — DISCUSSION/SUMMARY
[Essential Hypertension] : essential hypertension [Exercise Regimen] : an exercise regimen [Weight Loss] : weight loss [Sodium Restriction] : sodium restriction [Deteriorating] : deteriorating [Echocardiogram] : an echocardiogram [Treadmill Exercise Test] : a treadmill stress test [Patient] : the patient [Risks] : risks [Benefits] : benefits [Alternatives] : alternatives [___ Week(s)] : [unfilled] week(s) [With Me] : with me [FreeTextEntry1] : Nursing 36 yo  female with worsening HTN post delivery, and exertional fatigue/occasional dyspnea\par -Stress echo\par -Inc nifedipine to 60 mg/d\par -Na restriction to <2500 mg/d, exercise, weight loss

## 2020-11-02 NOTE — ASSESSMENT
[FreeTextEntry1] : 35/F with HTN during pregnancy-continues to have elevated BP post delivery. Pt is nursing child-hence requests increase in dose of nifedipine over addition of labetalol for BP control, even after discussion of beneficial effect of beta-blocker on lowering HR as well.Does report exertional fatigue, and worsening exertional dyspnea.

## 2020-11-02 NOTE — REASON FOR VISIT
[Initial Evaluation] : an initial evaluation of [Abnormal ECG] : an abnormal ECG [Hypertension] : hypertension [Family Member] : family member

## 2020-11-02 NOTE — HISTORY OF PRESENT ILLNESS
[FreeTextEntry1] : 35/F with known hypertension during pregnancy p/w elevated BP post delivery. She denies any symptoms of HA/blurry vision/abd pain/urinary symp/chest pain-occasionally endorses exertional dyspnea.Also reports excessive fatigue since childbirth-exacerbated with exertion. Reports adherence to nifedipine as Rxed by her OB-requests not to start different anti-HTN med today, but intensify current Rx.

## 2020-11-02 NOTE — REVIEW OF SYSTEMS
[Fever] : no fever [Chills] : no chills [Blurry Vision] : no blurred vision [Earache] : no earache [Shortness Of Breath] : no shortness of breath [Dyspnea on exertion] : dyspnea during exertion [Chest  Pressure] : no chest pressure [Chest Pain] : no chest pain [Lower Ext Edema] : no extremity edema [Leg Claudication] : no intermittent leg claudication [Palpitations] : no palpitations [Cough] : no cough [Abdominal Pain] : no abdominal pain [Heartburn] : no heartburn [Dysphagia] : no dysphagia [Joint Pain] : no joint pain [Skin: A Rash] : no rash: [Dizziness] : no dizziness [Convulsions] : no convulsions [Confusion] : no confusion was observed [Excessive Thirst] : no polydipsia [Easy Bleeding] : no tendency for easy bleeding

## 2020-11-03 LAB — CYTOLOGY SPEC DOC CYTO: SIGNIFICANT CHANGE UP

## 2020-12-11 NOTE — OB PROVIDER TRIAGE NOTE - HISTORY OF PRESENT ILLNESS
This is a 34 year old  patient of [facility]  at 39.3 weeks gestational age presents with complaints of contractions q 1.5-5 minutes. Does not require pain management at this time. Reports +GFM, denies LOF, VB. denies headache, blurry vision, epigastric pain, nausea, vomiting  AP course complicated by:  - chtn diagnosed at 5 months GA, on procardia 30 mg/ daily, last dose 830a yesterday    GBS neg as per patient    med: chtn  surg: D&C  GYN: fibroids  OB:  2004 ft  complicated by chtn 7#4  2011 ft  complicated by chtn 6#7  top x1 D&C  NKDA  current meds: pnv, procardia 30 mg daily [facility]

## 2021-01-07 NOTE — OB PROVIDER DELIVERY SUMMARY - NS_DELIVERYATTENDING1_OBGYN_ALL_OB_FT
CV Surgery: ECMO Daily Management    Date: 1/7/2021    Reason for Visit: Management of temporary circulatory support    ECMO information: VV ECMO    ECMO indication: Respiratory failure due to COVID-19.    Cannulation: R IJ 31F Pro-Rojas Duo    ECMO Results   Internal Changes:               ECMO Pump Type: Centrifugal Pump              ECMO Sweep Flow 11 L/min             ECMO Sweep Flow FiO2 1             ECMO Pump Flow 4.2 L/min             ECMO Pump Flow - RPM @ 4,400 RPMs   ECMO Pre-oxygenator Pressure 294 mm Hg      No line chatter.    Anticoagulation: None.    Oxygen:   FiO2 (%):  [40 %-100 %] 100 %  S RR:  [28] 28  S VT:  [220 mL] 220 mL  PEEP/CPAP/EPAP:  [5 cm H20-10 cm H20] 5 cm H20      Current Status: Afebrile. Stable on VV ECMO with decreased pressor requirements, increased leukocytosis, stable Hgb, and improved D-dimer. CXR with worsening b/l chest opacification.    Current Infusions:   Fentanyl 300 mcg/hr  Midazolam 15 mg/hr  Nimbex 3.5 mcg/kg/min  Norepinephrine 5.5 mcg/min  Propofol 30 mcg/kg/min    Vitals, labs, and imaging documented over the past 24 hours have been independently reviewed.      Assessment     Patient is a 63 year old male s/p VV ECMO for COVID-19 with right hemothorax requiring evacuation, coagulopathy requiring multiple blood products, and metabolic acidosis.      Plan      - Bronch today.  - Wean Propofol.  - Continue concentrated Albumin 100 mL TID.  - Blood cx x2 +Serratia marcescens 1/6/20. Started on Meropenem and Vancomycin. Continue per ID recs.      Charting performed by mercy Cesar for Dr. Kunz.    All medical record entries made by the mercy were at my direction. I have reviewed the chart and agree that the record accurately reflects my personal performance of the history, physical exam, hospital course, and assessment and plan.     Jil Mejía

## 2021-03-02 ENCOUNTER — RX RENEWAL (OUTPATIENT)
Age: 36
End: 2021-03-02

## 2021-03-02 RX ORDER — NIFEDIPINE 60 MG/1
60 TABLET, EXTENDED RELEASE ORAL DAILY
Qty: 90 | Refills: 0 | Status: ACTIVE | COMMUNITY
Start: 2020-10-29 | End: 1900-01-01

## 2021-08-26 NOTE — OB RN DELIVERY SUMMARY - NSNUMBEROFNEWBORNS_OBGYN_ALL_OB_NU
1 Consent (Spinal Accessory)/Introductory Paragraph: The rationale for Mohs was explained to the patient and consent was obtained. The risks, benefits and alternatives to therapy were discussed in detail. Specifically, the risks of damage to the spinal accessory nerve, infection, scarring, bleeding, prolonged wound healing, incomplete removal, allergy to anesthesia, and recurrence were addressed. Prior to the procedure, the treatment site was clearly identified and confirmed by the patient. All components of Universal Protocol/PAUSE Rule completed.

## 2023-08-28 NOTE — OB PROVIDER H&P - NSSCHADMISSION_OBGYN_A_OB
Quality 402: Tobacco Use And Help With Quitting Among Adolescents: Patient screened for tobacco and never smoked
Quality 130: Documentation Of Current Medications In The Medical Record: Current Medications Documented
Detail Level: Detailed
Quality 110: Preventive Care And Screening: Influenza Immunization: Influenza Immunization Administered during Influenza season
No

## 2023-09-30 NOTE — OB RN PATIENT PROFILE - STEPS TO INITIATE SKIN TO SKIN CONTACT DISCUSSED, INCLUDING INITIATING FATHER SKIN TO SKIN IF POSSIBLE.
Bedside report received from ASHUTOSH Lopez. Assumed care of patient. Daily plan of care discussed. Pt resting comfortably in bed with no signs of distress noted. Breathing even and unlabored. Patient waiting placement. Currently on a legal hold. Security present at bedside.  Patient reports no further needs at this time. Call light within reach. Bed locked in lowest position. Plan of care on going.     Statement Selected

## 2024-11-13 NOTE — OB RN TRIAGE NOTE - NS_GESTAGE_OBGYN_ALL_OB_FT
Medication:   fluticasone (FLONASE) 50 MCG/ACT nasal spray  passed protocol.   Last office visit date: 11/8/2024  Next appointment scheduled?: Yes   Number of refills given: 5    Upcoming appointment scheduled on: 5/16/2025   Per last office visit, patient is to follow up 6 months.   Last refill on: 6/4/2024       39w9l 01-Jan-2004

## 2025-05-16 NOTE — OB RN PATIENT PROFILE - PROVIDER NOTIFICATION
Regarding: [place] 19yr f back pain 5/10 fever 102  ----- Message from Phylicia WAGNER sent at 5/16/2025  6:02 PM CDT -----  Patient Name: Ileana Montemayor    Specialist or PCP Name: tien sánchez    Symptoms: back pain 5/10 fever 102     Pregnant (females aged 13-60. If Yes, how long?) : no    Call Back # :      Which State are you currently located in?: [place]    Name of Clinic Site : [facility]    Call arrived during: After Hours     Declines

## 2025-06-28 ENCOUNTER — INPATIENT (INPATIENT)
Facility: HOSPITAL | Age: 40
LOS: 4 days | Discharge: HOME HEALTH | End: 2025-07-03
Attending: SPECIALIST | Admitting: SPECIALIST
Payer: MEDICAID

## 2025-06-28 ENCOUNTER — APPOINTMENT (OUTPATIENT)
Dept: ANTEPARTUM | Facility: CLINIC | Age: 40
End: 2025-06-28

## 2025-06-28 ENCOUNTER — ASOB RESULT (OUTPATIENT)
Age: 40
End: 2025-06-28

## 2025-06-28 VITALS
HEART RATE: 111 BPM | OXYGEN SATURATION: 100 % | DIASTOLIC BLOOD PRESSURE: 115 MMHG | SYSTOLIC BLOOD PRESSURE: 223 MMHG | TEMPERATURE: 99 F | RESPIRATION RATE: 17 BRPM

## 2025-06-28 DIAGNOSIS — Z98.890 OTHER SPECIFIED POSTPROCEDURAL STATES: Chronic | ICD-10-CM

## 2025-06-28 DIAGNOSIS — O26.899 OTHER SPECIFIED PREGNANCY RELATED CONDITIONS, UNSPECIFIED TRIMESTER: ICD-10-CM

## 2025-06-28 LAB
ALBUMIN SERPL ELPH-MCNC: 4 G/DL — SIGNIFICANT CHANGE UP (ref 3.3–5)
ALP SERPL-CCNC: 232 U/L — HIGH (ref 40–120)
ALT FLD-CCNC: 9 U/L — SIGNIFICANT CHANGE UP (ref 4–33)
AMPHET UR-MCNC: NEGATIVE — SIGNIFICANT CHANGE UP
ANION GAP SERPL CALC-SCNC: 15 MMOL/L — HIGH (ref 7–14)
APPEARANCE UR: CLEAR — SIGNIFICANT CHANGE UP
AST SERPL-CCNC: 14 U/L — SIGNIFICANT CHANGE UP (ref 4–32)
BACTERIA # UR AUTO: NEGATIVE /HPF — SIGNIFICANT CHANGE UP
BARBITURATES UR SCN-MCNC: NEGATIVE — SIGNIFICANT CHANGE UP
BASOPHILS # BLD AUTO: 0.04 K/UL — SIGNIFICANT CHANGE UP (ref 0–0.2)
BASOPHILS NFR BLD AUTO: 0.4 % — SIGNIFICANT CHANGE UP (ref 0–2)
BENZODIAZ UR-MCNC: NEGATIVE — SIGNIFICANT CHANGE UP
BILIRUB SERPL-MCNC: <0.2 MG/DL — SIGNIFICANT CHANGE UP (ref 0.2–1.2)
BILIRUB UR-MCNC: NEGATIVE — SIGNIFICANT CHANGE UP
BLD GP AB SCN SERPL QL: NEGATIVE — SIGNIFICANT CHANGE UP
BUN SERPL-MCNC: 8 MG/DL — SIGNIFICANT CHANGE UP (ref 7–23)
CALCIUM SERPL-MCNC: 9 MG/DL — SIGNIFICANT CHANGE UP (ref 8.4–10.5)
CAST: 0 /LPF — SIGNIFICANT CHANGE UP (ref 0–4)
CHLORIDE SERPL-SCNC: 103 MMOL/L — SIGNIFICANT CHANGE UP (ref 98–107)
CO2 SERPL-SCNC: 17 MMOL/L — LOW (ref 22–31)
COCAINE METAB.OTHER UR-MCNC: NEGATIVE — SIGNIFICANT CHANGE UP
COLOR SPEC: YELLOW — SIGNIFICANT CHANGE UP
CREAT ?TM UR-MCNC: 18 MG/DL — SIGNIFICANT CHANGE UP
CREAT SERPL-MCNC: 0.55 MG/DL — SIGNIFICANT CHANGE UP (ref 0.5–1.3)
CREATININE URINE RESULT, DAU: 18 MG/DL — SIGNIFICANT CHANGE UP
DIFF PNL FLD: ABNORMAL
EGFR: 121 ML/MIN/1.73M2 — SIGNIFICANT CHANGE UP
EGFR: 121 ML/MIN/1.73M2 — SIGNIFICANT CHANGE UP
EOSINOPHIL # BLD AUTO: 0.06 K/UL — SIGNIFICANT CHANGE UP (ref 0–0.5)
EOSINOPHIL NFR BLD AUTO: 0.6 % — SIGNIFICANT CHANGE UP (ref 0–6)
FENTANYL UR QL SCN: NEGATIVE — SIGNIFICANT CHANGE UP
GLUCOSE SERPL-MCNC: 92 MG/DL — SIGNIFICANT CHANGE UP (ref 70–99)
GLUCOSE UR QL: NEGATIVE MG/DL — SIGNIFICANT CHANGE UP
HBV SURFACE AG SERPL QL IA: SIGNIFICANT CHANGE UP
HCT VFR BLD CALC: 37.5 % — SIGNIFICANT CHANGE UP (ref 34.5–45)
HCV AB S/CO SERPL IA: 0.15 S/CO — SIGNIFICANT CHANGE UP (ref 0–0.79)
HCV AB SERPL-IMP: SIGNIFICANT CHANGE UP
HGB BLD-MCNC: 12.3 G/DL — SIGNIFICANT CHANGE UP (ref 11.5–15.5)
HIV 1+2 AB+HIV1 P24 AG SERPL QL IA: SIGNIFICANT CHANGE UP
IMM GRANULOCYTES # BLD AUTO: 0.03 K/UL — SIGNIFICANT CHANGE UP (ref 0–0.07)
IMM GRANULOCYTES NFR BLD AUTO: 0.3 % — SIGNIFICANT CHANGE UP (ref 0–0.9)
KETONES UR QL: NEGATIVE MG/DL — SIGNIFICANT CHANGE UP
LDH SERPL L TO P-CCNC: 161 U/L — SIGNIFICANT CHANGE UP (ref 135–225)
LEUKOCYTE ESTERASE UR-ACNC: NEGATIVE — SIGNIFICANT CHANGE UP
LYMPHOCYTES # BLD AUTO: 2.26 K/UL — SIGNIFICANT CHANGE UP (ref 1–3.3)
LYMPHOCYTES NFR BLD AUTO: 21.6 % — SIGNIFICANT CHANGE UP (ref 13–44)
MAGNESIUM SERPL-MCNC: 4.1 MG/DL — HIGH (ref 1.6–2.6)
MAGNESIUM SERPL-MCNC: 4.5 MG/DL — HIGH (ref 1.6–2.6)
MAGNESIUM SERPL-MCNC: 4.6 MG/DL — HIGH (ref 1.6–2.6)
MCHC RBC-ENTMCNC: 31 PG — SIGNIFICANT CHANGE UP (ref 27–34)
MCHC RBC-ENTMCNC: 32.8 G/DL — SIGNIFICANT CHANGE UP (ref 32–36)
MCV RBC AUTO: 94.5 FL — SIGNIFICANT CHANGE UP (ref 80–100)
METHADONE UR-MCNC: NEGATIVE — SIGNIFICANT CHANGE UP
MONOCYTES # BLD AUTO: 0.85 K/UL — SIGNIFICANT CHANGE UP (ref 0–0.9)
MONOCYTES NFR BLD AUTO: 8.1 % — SIGNIFICANT CHANGE UP (ref 2–14)
NEUTROPHILS # BLD AUTO: 7.21 K/UL — SIGNIFICANT CHANGE UP (ref 1.8–7.4)
NEUTROPHILS NFR BLD AUTO: 69 % — SIGNIFICANT CHANGE UP (ref 43–77)
NITRITE UR-MCNC: NEGATIVE — SIGNIFICANT CHANGE UP
NRBC # BLD AUTO: 0 K/UL — SIGNIFICANT CHANGE UP (ref 0–0)
NRBC # FLD: 0 K/UL — SIGNIFICANT CHANGE UP (ref 0–0)
NRBC BLD AUTO-RTO: 0 /100 WBCS — SIGNIFICANT CHANGE UP (ref 0–0)
OPIATES UR-MCNC: NEGATIVE — SIGNIFICANT CHANGE UP
OXYCODONE UR-MCNC: NEGATIVE — SIGNIFICANT CHANGE UP
PCP SPEC-MCNC: SIGNIFICANT CHANGE UP
PCP UR-MCNC: NEGATIVE — SIGNIFICANT CHANGE UP
PH UR: 7 — SIGNIFICANT CHANGE UP (ref 5–8)
PLATELET # BLD AUTO: 342 K/UL — SIGNIFICANT CHANGE UP (ref 150–400)
PMV BLD: 9.4 FL — SIGNIFICANT CHANGE UP (ref 7–13)
POTASSIUM SERPL-MCNC: 4 MMOL/L — SIGNIFICANT CHANGE UP (ref 3.5–5.3)
POTASSIUM SERPL-SCNC: 4 MMOL/L — SIGNIFICANT CHANGE UP (ref 3.5–5.3)
PROT ?TM UR-MCNC: <4 MG/DL — SIGNIFICANT CHANGE UP
PROT SERPL-MCNC: 7.9 G/DL — SIGNIFICANT CHANGE UP (ref 6–8.3)
PROT UR-MCNC: NEGATIVE MG/DL — SIGNIFICANT CHANGE UP
PROT/CREAT UR-RTO: SIGNIFICANT CHANGE UP RATIO (ref 0–0.2)
RBC # BLD: 3.97 M/UL — SIGNIFICANT CHANGE UP (ref 3.8–5.2)
RBC # FLD: 15.1 % — HIGH (ref 10.3–14.5)
RBC CASTS # UR COMP ASSIST: 0 /HPF — SIGNIFICANT CHANGE UP (ref 0–4)
RH IG SCN BLD-IMP: POSITIVE — SIGNIFICANT CHANGE UP
RH IG SCN BLD-IMP: POSITIVE — SIGNIFICANT CHANGE UP
RUBV IGG SER-ACNC: 8.28 INDEX — SIGNIFICANT CHANGE UP
RUBV IGG SER-IMP: POSITIVE — SIGNIFICANT CHANGE UP
SODIUM SERPL-SCNC: 135 MMOL/L — SIGNIFICANT CHANGE UP (ref 135–145)
SP GR SPEC: 1 — SIGNIFICANT CHANGE UP (ref 1–1.03)
SQUAMOUS # UR AUTO: 1 /HPF — SIGNIFICANT CHANGE UP (ref 0–5)
T PALLIDUM AB TITR SER: NEGATIVE — SIGNIFICANT CHANGE UP
THC UR QL: NEGATIVE — SIGNIFICANT CHANGE UP
URATE SERPL-MCNC: 5.8 MG/DL — SIGNIFICANT CHANGE UP (ref 2.5–7)
UROBILINOGEN FLD QL: 0.2 MG/DL — SIGNIFICANT CHANGE UP (ref 0.2–1)
WBC # BLD: 10.45 K/UL — SIGNIFICANT CHANGE UP (ref 3.8–10.5)
WBC # FLD AUTO: 10.45 K/UL — SIGNIFICANT CHANGE UP (ref 3.8–10.5)
WBC UR QL: 0 /HPF — SIGNIFICANT CHANGE UP (ref 0–5)

## 2025-06-28 PROCEDURE — 93010 ELECTROCARDIOGRAM REPORT: CPT

## 2025-06-28 PROCEDURE — 88307 TISSUE EXAM BY PATHOLOGIST: CPT | Mod: 26

## 2025-06-28 PROCEDURE — 59409 OBSTETRICAL CARE: CPT | Mod: U9

## 2025-06-28 RX ORDER — SODIUM CHLORIDE 9 G/1000ML
1000 INJECTION, SOLUTION INTRAVENOUS
Refills: 0 | Status: DISCONTINUED | OUTPATIENT
Start: 2025-06-28 | End: 2025-06-28

## 2025-06-28 RX ORDER — NIFEDIPINE 30 MG
30 TABLET, EXTENDED RELEASE 24 HR ORAL
Refills: 0 | Status: DISCONTINUED | OUTPATIENT
Start: 2025-06-29 | End: 2025-07-01

## 2025-06-28 RX ORDER — OXYTOCIN-SODIUM CHLORIDE 0.9% IV SOLN 30 UNIT/500ML 30-0.9/5 UT/ML-%
SOLUTION INTRAVENOUS
Qty: 30 | Refills: 0 | Status: DISCONTINUED | OUTPATIENT
Start: 2025-06-28 | End: 2025-06-28

## 2025-06-28 RX ORDER — LABETALOL HYDROCHLORIDE 200 MG/1
20 TABLET, FILM COATED ORAL ONCE
Refills: 0 | Status: COMPLETED | OUTPATIENT
Start: 2025-06-28 | End: 2025-06-28

## 2025-06-28 RX ORDER — SODIUM CHLORIDE 9 G/1000ML
1000 INJECTION, SOLUTION INTRAVENOUS
Refills: 0 | Status: DISCONTINUED | OUTPATIENT
Start: 2025-06-28 | End: 2025-06-30

## 2025-06-28 RX ORDER — NIFEDIPINE 30 MG
30 TABLET, EXTENDED RELEASE 24 HR ORAL ONCE
Refills: 0 | Status: COMPLETED | OUTPATIENT
Start: 2025-06-28 | End: 2025-06-28

## 2025-06-28 RX ORDER — CEFAZOLIN SODIUM IN 0.9 % NACL 3 G/100 ML
2000 INTRAVENOUS SOLUTION, PIGGYBACK (ML) INTRAVENOUS ONCE
Refills: 0 | Status: DISCONTINUED | OUTPATIENT
Start: 2025-06-28 | End: 2025-07-03

## 2025-06-28 RX ORDER — OXYTOCIN-SODIUM CHLORIDE 0.9% IV SOLN 30 UNIT/500ML 30-0.9/5 UT/ML-%
167 SOLUTION INTRAVENOUS
Qty: 30 | Refills: 0 | Status: DISCONTINUED | OUTPATIENT
Start: 2025-06-28 | End: 2025-06-28

## 2025-06-28 RX ORDER — MODIFIED LANOLIN 100 %
1 CREAM (GRAM) TOPICAL EVERY 6 HOURS
Refills: 0 | Status: DISCONTINUED | OUTPATIENT
Start: 2025-06-28 | End: 2025-07-03

## 2025-06-28 RX ORDER — IBUPROFEN 200 MG
600 TABLET ORAL EVERY 6 HOURS
Refills: 0 | Status: COMPLETED | OUTPATIENT
Start: 2025-06-28 | End: 2026-05-27

## 2025-06-28 RX ORDER — ACETAMINOPHEN 500 MG/5ML
975 LIQUID (ML) ORAL
Refills: 0 | Status: DISCONTINUED | OUTPATIENT
Start: 2025-06-28 | End: 2025-07-03

## 2025-06-28 RX ORDER — OXYTOCIN-SODIUM CHLORIDE 0.9% IV SOLN 30 UNIT/500ML 30-0.9/5 UT/ML-%
2 SOLUTION INTRAVENOUS
Qty: 30 | Refills: 0 | Status: DISCONTINUED | OUTPATIENT
Start: 2025-06-28 | End: 2025-06-30

## 2025-06-28 RX ORDER — LABETALOL HYDROCHLORIDE 200 MG/1
40 TABLET, FILM COATED ORAL ONCE
Refills: 0 | Status: COMPLETED | OUTPATIENT
Start: 2025-06-28 | End: 2025-06-28

## 2025-06-28 RX ORDER — DIBUCAINE 10 MG/G
1 OINTMENT TOPICAL EVERY 6 HOURS
Refills: 0 | Status: DISCONTINUED | OUTPATIENT
Start: 2025-06-28 | End: 2025-07-03

## 2025-06-28 RX ORDER — SIMETHICONE 80 MG
80 TABLET,CHEWABLE ORAL EVERY 4 HOURS
Refills: 0 | Status: DISCONTINUED | OUTPATIENT
Start: 2025-06-28 | End: 2025-07-03

## 2025-06-28 RX ORDER — NIFEDIPINE 30 MG
60 TABLET, EXTENDED RELEASE 24 HR ORAL
Refills: 0 | Status: DISCONTINUED | OUTPATIENT
Start: 2025-06-29 | End: 2025-07-01

## 2025-06-28 RX ORDER — KETOROLAC TROMETHAMINE 30 MG/ML
30 INJECTION, SOLUTION INTRAMUSCULAR; INTRAVENOUS ONCE
Refills: 0 | Status: DISCONTINUED | OUTPATIENT
Start: 2025-06-28 | End: 2025-07-03

## 2025-06-28 RX ORDER — WITCH HAZEL LEAF
1 FLUID EXTRACT MISCELLANEOUS EVERY 4 HOURS
Refills: 0 | Status: DISCONTINUED | OUTPATIENT
Start: 2025-06-28 | End: 2025-07-03

## 2025-06-28 RX ORDER — PRAMOXINE HCL 1 %
1 GEL (GRAM) TOPICAL EVERY 4 HOURS
Refills: 0 | Status: DISCONTINUED | OUTPATIENT
Start: 2025-06-28 | End: 2025-07-03

## 2025-06-28 RX ORDER — NIFEDIPINE 30 MG
30 TABLET, EXTENDED RELEASE 24 HR ORAL EVERY 12 HOURS
Refills: 0 | Status: DISCONTINUED | OUTPATIENT
Start: 2025-06-28 | End: 2025-06-28

## 2025-06-28 RX ORDER — MAGNESIUM SULFATE 500 MG/ML
2 SYRINGE (ML) INJECTION
Qty: 40 | Refills: 0 | Status: COMPLETED | OUTPATIENT
Start: 2025-06-28 | End: 2025-06-29

## 2025-06-28 RX ORDER — BENZOCAINE 220 MG/G
1 SPRAY, METERED PERIODONTAL EVERY 6 HOURS
Refills: 0 | Status: DISCONTINUED | OUTPATIENT
Start: 2025-06-28 | End: 2025-07-03

## 2025-06-28 RX ORDER — DIPHENHYDRAMINE HCL 12.5MG/5ML
25 ELIXIR ORAL EVERY 6 HOURS
Refills: 0 | Status: DISCONTINUED | OUTPATIENT
Start: 2025-06-28 | End: 2025-07-03

## 2025-06-28 RX ORDER — PRENATAL 136/IRON/FOLIC ACID 27 MG-1 MG
1 TABLET ORAL DAILY
Refills: 0 | Status: DISCONTINUED | OUTPATIENT
Start: 2025-06-28 | End: 2025-07-03

## 2025-06-28 RX ORDER — OXYTOCIN-SODIUM CHLORIDE 0.9% IV SOLN 30 UNIT/500ML 30-0.9/5 UT/ML-%
167 SOLUTION INTRAVENOUS
Qty: 30 | Refills: 0 | Status: DISCONTINUED | OUTPATIENT
Start: 2025-06-28 | End: 2025-06-30

## 2025-06-28 RX ORDER — OXYTOCIN-SODIUM CHLORIDE 0.9% IV SOLN 30 UNIT/500ML 30-0.9/5 UT/ML-%
10 SOLUTION INTRAVENOUS ONCE
Refills: 0 | Status: COMPLETED | OUTPATIENT
Start: 2025-06-28 | End: 2025-06-28

## 2025-06-28 RX ORDER — OXYCODONE HYDROCHLORIDE 30 MG/1
5 TABLET ORAL
Refills: 0 | Status: DISCONTINUED | OUTPATIENT
Start: 2025-06-28 | End: 2025-07-03

## 2025-06-28 RX ORDER — NIFEDIPINE 30 MG
30 TABLET, EXTENDED RELEASE 24 HR ORAL DAILY
Refills: 0 | Status: DISCONTINUED | OUTPATIENT
Start: 2025-06-28 | End: 2025-06-28

## 2025-06-28 RX ORDER — MAGNESIUM SULFATE 500 MG/ML
4 SYRINGE (ML) INJECTION ONCE
Refills: 0 | Status: COMPLETED | OUTPATIENT
Start: 2025-06-28 | End: 2025-06-28

## 2025-06-28 RX ORDER — LABETALOL HYDROCHLORIDE 200 MG/1
40 TABLET, FILM COATED ORAL ONCE
Refills: 0 | Status: DISCONTINUED | OUTPATIENT
Start: 2025-06-28 | End: 2025-06-28

## 2025-06-28 RX ORDER — NIFEDIPINE 30 MG
10 TABLET, EXTENDED RELEASE 24 HR ORAL ONCE
Refills: 0 | Status: COMPLETED | OUTPATIENT
Start: 2025-06-28 | End: 2025-06-28

## 2025-06-28 RX ORDER — HYDROCORTISONE 10 MG/G
1 CREAM TOPICAL EVERY 6 HOURS
Refills: 0 | Status: DISCONTINUED | OUTPATIENT
Start: 2025-06-28 | End: 2025-07-03

## 2025-06-28 RX ORDER — MAGNESIUM HYDROXIDE 400 MG/5ML
30 SUSPENSION ORAL
Refills: 0 | Status: DISCONTINUED | OUTPATIENT
Start: 2025-06-28 | End: 2025-07-03

## 2025-06-28 RX ORDER — OXYCODONE HYDROCHLORIDE 30 MG/1
5 TABLET ORAL ONCE
Refills: 0 | Status: DISCONTINUED | OUTPATIENT
Start: 2025-06-28 | End: 2025-07-03

## 2025-06-28 RX ADMIN — Medication 300 GRAM(S): at 04:46

## 2025-06-28 RX ADMIN — Medication 30 MILLIGRAM(S): at 08:57

## 2025-06-28 RX ADMIN — Medication 30 MILLIGRAM(S): at 04:42

## 2025-06-28 RX ADMIN — LABETALOL HYDROCHLORIDE 40 MILLIGRAM(S): 200 TABLET, FILM COATED ORAL at 10:10

## 2025-06-28 RX ADMIN — Medication 3 MILLILITER(S): at 16:27

## 2025-06-28 RX ADMIN — Medication 50 GM/HR: at 07:11

## 2025-06-28 RX ADMIN — Medication 30 MILLIGRAM(S): at 16:51

## 2025-06-28 RX ADMIN — Medication 3 MILLILITER(S): at 22:00

## 2025-06-28 RX ADMIN — OXYTOCIN-SODIUM CHLORIDE 0.9% IV SOLN 30 UNIT/500ML 10 UNIT(S): 30-0.9/5 SOLUTION at 09:25

## 2025-06-28 RX ADMIN — Medication 50 GM/HR: at 18:36

## 2025-06-28 RX ADMIN — Medication 50 GM/HR: at 06:09

## 2025-06-28 RX ADMIN — Medication 1 APPLICATION(S): at 07:01

## 2025-06-28 RX ADMIN — Medication 975 MILLIGRAM(S): at 22:10

## 2025-06-28 RX ADMIN — Medication 975 MILLIGRAM(S): at 21:20

## 2025-06-28 RX ADMIN — Medication 10 MILLIGRAM(S): at 04:41

## 2025-06-28 RX ADMIN — LABETALOL HYDROCHLORIDE 20 MILLIGRAM(S): 200 TABLET, FILM COATED ORAL at 05:09

## 2025-06-28 RX ADMIN — Medication 50 GM/HR: at 19:53

## 2025-06-28 RX ADMIN — SODIUM CHLORIDE 50 MILLILITER(S): 9 INJECTION, SOLUTION INTRAVENOUS at 05:06

## 2025-06-28 RX ADMIN — LABETALOL HYDROCHLORIDE 20 MILLIGRAM(S): 200 TABLET, FILM COATED ORAL at 08:48

## 2025-06-28 RX ADMIN — OXYTOCIN-SODIUM CHLORIDE 0.9% IV SOLN 30 UNIT/500ML 6 MILLIUNIT(S)/MIN: 30-0.9/5 SOLUTION at 08:13

## 2025-06-28 NOTE — OB RN PATIENT PROFILE - FUNCTIONAL ASSESSMENT - DAILY ACTIVITY PT AGE POP HIDDEN
Cardiomyopathy    COPD (Chronic Obstructive Pulmonary Disease)    Herniation of Cervical Intervertebral Disc    Pneumonia    Syncope Adult

## 2025-06-28 NOTE — OB RN TRIAGE NOTE - NS_TRIAGEADDITIONAL COMMENTS_OBGYN_ALL_OB_FT
Diagnosis:   Arthrodesis status (Z98.1)   1. C5 anterior corpectomy (35498)  2. C5 interbody graft placement.(27652)  3. C4-6 anterior instrumentation with cervical plate and screws (68016)  4. C4-5, C5-6 anterior fusion (60828, 70408)  4. Local autograft. (25910)  5. Use of operating microscope (93200)        Referring Provider: Huang Cedeno  Date of Evaluation:    11/4/2024     Precautions:   spinal- no lifting over 15#- was wearing a collar for 6 weeks.  Next MD visit:   2/14/24 12/12/24- appt re R shoulder pain  Date of Surgery: n/a   Insurance Primary/Secondary: [employer] OUT OF STATE / N/A     # Auth Visits: 60 visits; no auth          Pt arrived 12 min late   Subjective: \"a little sore in the shoulder today. Started an hour or two ago\" \"I didn't do a lot of the exercises but I did use it a lot\" - describes using the RUE.   Chief complaint tilting head R/L- side-bending - specifically noticed with sleeping positions.   Pain: 3/10    Objective:   Cervical AROM: (* denotes performed with pain)  Rotation: R 45*R tightness; L 50 *L tightness  Side bending: R: 16 deg; L; 15 deg     Assessment: Naomi made little gain in cervical AROM rotation between sessions. Progressed to thoracic rotation to improve rotational tolerance down the chair. Tolerated this well and updated HEP. Resulted in improved L rotation 5 deg. At this time pt demonstrates limited compliance with HEP but tolerates PT sessions well.     Goals:  (to be met in 8  visits) Progressing toward goals 11/25/2024     Pt will improve cervical AROM flexion by 10  degrees to improve tolerance for looking down to tie shoes   Pt will improve cervical AROM extension by 10 degrees to improve tolerance for putting dishes into overhead cabinets   Pt will improve cervical AROM rotation to >60 degrees to improve tolerance for turning head to check blind spot while driving  Pt will be independent and compliant with comprehensive HEP to maintain progress achieved in PT      Plan: Continue per plan of care. Plan for next therapy session: continue postural exercises- self thoracic mobility   Date: 11/11/2024  TX#: 2/8  Date: 11/18/2024                 TX#: 3/8  Date: 11/25/2024                 TX#: 4/8  Date:                 TX#: 5/ Date:   Tx#: 6/   There ex: 38 min   UBE level 1 forward 5 min   Seated scapular retraction 10 reps   Pain free AROM: - seated in chair with back support:  Cervical rotation 10 reps R/l   Flexion extension cervical spine 10 reps R/l   SB R/L 10 reps   Chest stretch 30 sec x 3 sets   Band row yellow band 10 reps - pain free   Pulley scaption 10 reps R/L   Thoracic extension 10 reps   [place] and arrow- 10 reps - deferred mechanics   Bilateral ER yellow band 10 reps   Pt education avoiding pushing into pain- angela with ROM to avoid disrupting surgery. Pt educated on benefits and use of HEP updates  There ex: 40 min   UBE level 1 2.5 min forward; 2 5 min retro;  5 min total - no resistance   Scapular retraction 10 reps   Yellow band row 15 reps x 2 sets   Cervical rotation 3 reps  Chest stretch 30 sec x 3 sets - doorway    Wall push ups 10 reps   Seated bicep curls 2# 10 reps x 2 sets   Supine shoulder AROM flexion 10 reps to tolerance   Supine bilateral ER yellow band 10 reps   Supine band pull apart 10 reps yellow band   Pt education: strengthening- HEP updates and handouts provided.      There ex: 40 min   UBE level 1 2.5 min forward; 2 5 min retro;  5 min total - no resistance   Seated band row yellow band- cues for mechanics   Cervical rotation 3 reps  Chest stretch 30 sec x 3 sets - doorway    Wall push ups 10 reps   Seated bicep curls 3# 10 reps x 2 sets   Seated bilateral ER yellow band   Supine wand flexion 10 reps   Sidelying open books 10 reps R/L  Thoracic extension 10 reps over chair 10 reps  Pt education: benefits of exercises and importance of compliance.                                    HEP: : Access Code: [email or web address]  URL:  [email or web address]  Date: 11/18/2024  Prepared by: Johnna Perez    Exercises  - Seated Scapular Retraction  - 3 x daily - 7 x weekly - 1 sets - 10 reps  - Seated Cervical Rotation AROM  - 3 x daily - 7 x weekly - 1 sets - 5 reps  - Doorway Pec Stretch at 90 Degrees Abduction  - 1 x daily - 7 x weekly - 2 sets - 1 reps - 30 sec hold  - Shoulder External Rotation and Scapular Retraction with Resistance  - 1 x daily - 7 x weekly - 2 sets - 10 reps  - Standing Bicep Curls Supinated with Dumbbells  - 1 x daily - 7 x weekly - 1-2 sets - 10 reps - 1-2# canned good  - Supine Shoulder Flexion Extension Full Range AROM  - 1 x daily - 7 x weekly - 1-2 sets - 10 reps  11/25/2024 FOCUS open books and cervical rotation   Charges: There ex: 2    Total Timed Treatment: 30 min  Total Treatment Time: 30 min   ht 5ft 7  wt 218

## 2025-06-28 NOTE — OB PROVIDER DELIVERY SUMMARY - NSSELHIDDEN_OBGYN_ALL_OB_FT
[NS_DeliveryAttending1_OBGYN_ALL_OB_FT:[id number]],[NS_DeliveryAssist1_OBGYN_ALL_OB_FT:[id number]=]

## 2025-06-28 NOTE — OB RN PATIENT PROFILE - AVIAN FLU SYMPTOMS
-- DO NOT REPLY / DO NOT REPLY ALL --  -- Message is from [facility] [employer] ([facility]) --    -- USE ONLY UNTIL 8/9/22 @ 10:00PM --    Patient is requesting a medication refill - medication is on active medication list    Patient is currently OUT of the requested medication - sent as HIGH priority    Patient would like to be contacted when medication is sent over to the pharmacy.  Pharmacy  [id number] - [place] [address] At [facility]    Patient confirmed the above pharmacy as correct?  Yes      Caller Information       Type Contact Phone/Fax    08/18/2022 05:57 AM CDT Phone (Incoming) May Proctor (Self) [phone] (H)          Alternative phone number: [phone]    Turnaround time given to caller:   \"Your doctor's office is closed. This message will be sent to our nurse. They will return your call as soon as they review your message. (Calls after 10 PM will be returned tomorrow morning.)\"  
Medication name: famotidine (PEPCID) 20 MG tablet  Last Refill Details: Date patient reported 07/07/2022, # of tablets: not indicated, # of refills approved:not indicated    Ordering provider name: outside provider   Last office visit: 06/17/2022 with provider Nathan Saini MD       Unable to refill medication per established guidelines, request forwarded to provider for review.  Caller advised to contact office for follow-up.      Patient c/o chest pain thinks it may be hurt burn.   Pain scale 5   Per RN Emergent    Patient also requesting metFORMIN (GLUCOPHAGE) 500 MG tablet  And   simvastatin (ZOCOR) 40 MG tablet             
No

## 2025-06-28 NOTE — OB PROVIDER H&P - HISTORY OF PRESENT ILLNESS
PNC: [facility]   38y/o  @39.1wks presents with contractions felt every 7mins since 11pm, pain scale 6/10  Reports good fetal movement  Denies  PNC: [facility]   40y/o  @39.1wks presents with contractions felt every 7mins since 11pm, pain scale 6/10  EMATALA BP: 203/114, Denies SOB, HA, Blurry Vision or Epigastric Pain  Reports good fetal movement  Denies LOF/VB    Allergies: Denies  Medications: Procardia [id number] (last dose  am), Aspirin low dose daily, PNV  NPO

## 2025-06-28 NOTE — OB RN PATIENT PROFILE - 'COMMUNITY AGENCIES/SUPPORT GROUPS, OB PROFILE
food stamps, rentals assist/Supplemental Nutrition Assistance Program (SNAP) food stamps, rentals assistance/Supplemental Nutrition Assistance Program (SNAP)

## 2025-06-28 NOTE — OB PROVIDER LABOR PROGRESS NOTE - NS_SUBJECTIVE/OBJECTIVE_OBGYN_ALL_OB_FT
Labor & Delivery Progress Note     Pt examined @ due to    T(C): 36.7 (06-28-25 @ 06:29), Max: 37 (06-28-25 @ 04:26)  HR: 91 (06-28-25 @ 07:28) (78 - 115)  BP: 154/92 (06-28-25 @ 07:26) (130/75 - 227/118)  RR: 19 (06-28-25 @ 06:29) (16 - 19)  SpO2: 98% (06-28-25 @ 07:28) (93% - 100%) Labor & Delivery Progress Note     Pt examined @ 0746 due to    T(C): 36.7 (06-28-25 @ 06:29), Max: 37 (06-28-25 @ 04:26)  HR: 91 (06-28-25 @ 07:28) (78 - 115)  BP: 154/92 (06-28-25 @ 07:26) (130/75 - 227/118)  RR: 19 (06-28-25 @ 06:29) (16 - 19)  SpO2: 98% (06-28-25 @ 07:28) (93% - 100%)

## 2025-06-28 NOTE — OB PROVIDER H&P - NS_OBGYNHISTORY_OBGYN_ALL_OB_FT
TOP x 2  D&C x 2  HPV  2004  7#4  2011  6#7  2020  6#4 GYN: HPV  OB:   TOP x 2, D&C x 2  2004  7#4 cHTN  2011  6#7 cHTN  2020  6#4 cHTN      AP course complicated by  #cHTN- Procardia XL30mg, does not follow with a cardiologist  [facility]   #GBS Unknown, patient states negative and has never been positive in all her pregnancies

## 2025-06-28 NOTE — OB RN DELIVERY SUMMARY - NS_TIMERUPTUREDADMITTED_OBGYN_ALL_OB_FT
Patient is a 53-year-old male with past medical history of migraines, hypertension, hyperlipidemia, chronic pain disorder and facioscapulohumeral muscular dystrophy. History of medication induced constipation currently doing Miralax every other day. No GI complaints. No prior colonoscopy. No family history of crc.  Denies any brbpr, melena, abdominal pain, unintentional weight loss, early satiety, fever, chills, diarrhea, constipation, dysphagia, odynophagia, or reflux.   Denies any use of blood thinners, pacemaker or defib. No cardiac or pulmonary issues. Patient ambulates with a cane.
0 Minute(s)

## 2025-06-28 NOTE — OB PROVIDER H&P - NS_GESTAGE_OBGYN_ALL_OB_FT
I have reviewed discharge instructions with the patient. The patient verbalized understanding. Patient left ED via Discharge Method: ambulatory to Home with (insert name of family/friend, self, transport self ). Opportunity for questions and clarification provided. Patient given 2 scripts. To continue your aftercare when you leave the hospital, you may receive an automated call from our care team to check in on how you are doing. This is a free service and part of our promise to provide the best care and service to meet your aftercare needs.  If you have questions, or wish to unsubscribe from this service please call [phone]. Thank you for Choosing our [facility] [employer] [facility] Emergency Department.        Jennifer Russ RN  04/03/23 2007
39w1d

## 2025-06-28 NOTE — OB PROVIDER DELIVERY SUMMARY - NSPROVIDERDELIVERYNOTE_OBGYN_ALL_OB_FT
Service attending    I was present and participated in  liveborn male infant en caul-- without complications precipitous.  Placenta sent to pathology. Cord gases sent.    Morelia DE LA CRUZ Spontaneous vaginal delivery of liveborn M infant, en caul, from MICHELLE position. Head, shoulders, and body delivered easily. Infant was passed to mother and suctioned. Cord was clamped and cut. Cord gasses were obtained. Apgars 5/8. Placenta delivered intact spontaneously. Placenta examined and all lobes were accounted for. Fundal massage was given and uterine fundus was found to be firm. There was mild lower uterine atony. IM pitocin and Cytotec NE was administered. Small clots were expressed from the lower uterine segment. Tone was then noted to have improved. Vaginal exam revealed an intact cervix, vaginal walls and sulci. Patient had an intact perineum. Excellent hemostasis was noted. Patient was stable. Count was correct x 2.     Service attending    I was present and participated in  liveborn male infant en caul-- without complications precipitous.  Placenta sent to pathology. Cord gases sent.    Morelia DE LA CRUZ

## 2025-06-28 NOTE — OB RN DELIVERY SUMMARY - NSSELHIDDEN_OBGYN_ALL_OB_FT
[NS_DeliveryAttending1_OBGYN_ALL_OB_FT:[id number]],[NS_DeliveryAssist1_OBGYN_ALL_OB_FT:[id number]],[NS_DeliveryRN_OBGYN_ALL_OB_FT:[id number]]

## 2025-06-28 NOTE — OB RN DELIVERY SUMMARY - NSBABYASEPSISRSK_OBGYN_N_OB_NU
[de-identified] : Mr. Jim Olvera presents for device follow up. He is a 79 year old man with history of complete heart s/p PPM with worsening LV dysfunction s/p upgrade to CRT-D, CAD s/p MI, HTN, HL. He presents for device follow up. He denies any chest pain, SOB, PND/orthopnea, LE edema, dizziness or syncope.\par \par Interrogation shows normal pace/sense function on this Medtronic CRT-D device. He is biV paced 99% with battery life 6.8 years. There have been no events. 0.04

## 2025-06-28 NOTE — OB PROVIDER H&P - NSLOWPPHRISK_OBGYN_A_OB
No previous uterine incision/Tello Pregnancy/Less than or equal to 4 previous vaginal births/No known bleeding disorder/No history of postpartum hemorrhage/No other PPH risks indicated

## 2025-06-28 NOTE — OB RN PATIENT PROFILE - FALL HARM RISK - UNIVERSAL INTERVENTIONS
Bed in lowest position, wheels locked, appropriate side rails in place/Call bell, personal items and telephone in reach/Instruct patient to call for assistance before getting out of bed or chair/Non-slip footwear when patient is out of bed/[place] to call system/Physically safe environment - no spills, clutter or unnecessary equipment/Purposeful Proactive Rounding/Room/bathroom lighting operational, light cord in reach

## 2025-06-28 NOTE — OB RN PATIENT PROFILE - PRO INTERPRETER NEED 2
Metformin  Routing refill request to provider for review/approval because:  Labs not current:  A1C    Glimepiride  Routing refill request to provider for review/approval because:  Labs not current:  A1C      Mine Escobar RN   English

## 2025-06-28 NOTE — OB NEONATOLOGY/PEDIATRICIAN DELIVERY SUMMARY - NSPEDSNEONOTESA_OBGYN_ALL_OB_FT
Baby is a 39.1 wk SGA male born to a 38 y/o  mother via . Peds called to delivery for meconium. Maternal history of CHTN on nifedipine. Pregnancy complicated by siPEC, treated with magnesium. Maternal blood type A+. PNL HIV negative, HepC negative, HepB sent, RPR sent, and Rubella sent. GBS unknown. SROM at time of delivery, mec fluids. Delivery uncomplicated. Baby born with poor color and respiratory effort, crying spontaneously. Warmed, dried, suctioned and stimulated. Apgars 5/8.  CPAP started at 2 MOL for work of breathing and poor color. Max CPAP settings 5L/70%, weaned to 21% by 8MOL. NICU called as baby required CPAP 5/21% to maintain SpO2 >88%. Highest maternal temp 36.8 C. EOS 0.04. Mom plans to breastfeed and formula feed. Declines HepB vaccine. Circ requested. Admitted to NICU on CPAP 6L/21%.     BW: 1950g  : 25  TOB: 09:14

## 2025-06-28 NOTE — OB PROVIDER H&P - PROBLEM SELECTOR PLAN 1
Admit for siPEC @39.1wks  D/W Dr. Morris  Routine Orders, Prenatal Labs ordered  IOL Pitocin and Cervical Cooks Balloon  Epidural as needed, declined at admission  Urine Toxicology, Consent Signed  siPEC  -Magnesium Sulfate for seizure precautions  -HELLP labs  -Procardia XL30 mg BID  -Procardia IR10mg and Labetalol 20 IVP, for severe sustained blood pressures   GBS Unknown, patient states negative   Social Work Consult, previously lived in shelter, had not been managing her chronic illness    Risks, benefits, alternatives, and possible complications have been discussed in detail with the patient in her native language. Pre-admission, admission, and post admission procedures and expectations were discussed in detail. All questions answered, all appropriate hospital consents were signed. Anticipate normal vaginal delivery.   Informed consent was obtained. The following was discussed:   - Induction/augmentation of labor: use of medication and/or cook balloon to begin or enhance labor   - Obstetrical management including internal fetal/contraction monitoring   - Normal vaginal delivery   - Possible  section

## 2025-06-28 NOTE — OB PROVIDER LABOR PROGRESS NOTE - ASSESSMENT
A/P:   38yo  @ 39w1d admitted for induction in the setting of superimposed preeclampsia with severe features. Patient had concerns regarding induction of labor. Reviewed her dx and that delivery/induction represents the treatment. Given the opportunity to ask questions and have concerns addressed. All questions were answered to the patient's apparent satisfaction. Patient amendable to proceed with induction    #Labor   - Cervical ripening balloon placed. Patient tolerated the procedure well. Vaginal and uterine balloons were filled with 60cc of normal Saline each     #Fetal Wellbeing   - Cat 1    # Issues   - Ordered for Nifedipine 30mg BID. HELLP labs upon presentation were wnl     #Pain Control   - Epidural or IV PRN    Joshua Almanza, PGY-3  Obstetrics & Gynecology     d/w Dr. LIANET Choa, obgyn service attending    ***Incomplete note***  A/P:   40yo  @ 39w1d admitted for induction in the setting of superimposed preeclampsia with severe features. Patient had concerns regarding induction of labor. Reviewed her dx and that delivery/induction represents the treatment. Given the opportunity to ask questions and have concerns addressed. All questions were answered to the patient's apparent satisfaction. Patient amendable to proceed with induction    #Labor   - Cervical ripening balloon placed. Patient tolerated the procedure well. Uterine balloon filled with 80. Vaginal balloon filled with 40  - For Oxytocin to start at 6 and increase by 2U    #Fetal Wellbeing   - Cat 1    # Issues   - Ordered for Nifedipine 30mg BID. HELLP labs upon presentation were wnl     #Pain Control   - Epidural or IV PRN    Joshua Almanza, PGY-3  Obstetrics & Gynecology     d/w Dr. LIANET Chao, obgyn service attending

## 2025-06-28 NOTE — OB RN PATIENT PROFILE - NS_OBGYNHISTORY_OBGYN_ALL_OB_FT
GYN: HPV  OB:   TOP x 2, D&C x 2  2004  7#4 cHTN  2011  6#7 cHTN  2020  6#4 cHTN      AP course complicated by  #cHTN- Procardia XL30mg, does not follow with a cardiologist  [facility]   #GBS Unknown, patient states negative and has never been positive in all her pregnancies

## 2025-06-28 NOTE — OB RN DELIVERY SUMMARY - NS_SEPSISRSKCALC_OBGYN_ALL_OB_FT
EOS calculated successfully. EOS Risk Factor: 0.5/1000 live births ([facility] national incidence); GA=39w1d; Temp=98.6; ROM=0; GBS='Unknown'; Antibiotics='No antibiotics or any antibiotics < 2 hrs prior to birth'

## 2025-06-28 NOTE — OB PROVIDER H&P - NSHPPHYSICALEXAM_GEN_ALL_CORE
Vital Signs Last 24 Hrs  T(C): 37 (28 Jun 2025 04:26), Max: 37 (28 Jun 2025 04:26)  T(F): 98.6 (28 Jun 2025 04:26), Max: 98.6 (28 Jun 2025 04:26)  HR: 96 (28 Jun 2025 05:12) (96 - 115)  BP: 176/82 (28 Jun 2025 05:07) (176/82 - 227/118)  RR: 16 (28 Jun 2025 04:26) (16 - 16)  SpO2: 94% (28 Jun 2025 05:12) (94% - 100%)  Parameters below as of 28 Jun 2025 04:26  Patient On (Oxygen Delivery Method): room air  Assessment reveals VSS  General: Female sitting comfortably in no apparent distress  Neuro: No facial asymmetry, no slurred speech, moves all 4 extremities  Mood: Alert and lucid, appropriate mood and affect  A&Ox3  Lungs- clear bilateral  Heart- normal rate and regular rhythm  Extremities- Warm, Dry, no edema present, good pulses   Abdomen soft, NT, gravid  NST: Cat 1 Tracing   Baseline  (  130) BPM  Variability ( x )  Moderate   (  ) Minimal  (  ) Absent  (  )  Marked  Accelerations (x  ) 15x15   (  ) 10x10  (  ) no  Decelerations (x  ) no  (  ) Variable  (  ) Early  (  ) Late      Description _________  Contractions (x  ) no  (  ) yes     Description  __________  Interpretation (  x) reactive   (  )  non-reactive  Transabdominal Ultrasound- images saved ASOB, vtx, ant placenta, mvp6.22  Vaginal Exam- 0.5/50/-3     Completed with: Sherry BRICEÑO at bedside         PLAN:  Admit for siPEC @39.1wks

## 2025-06-29 LAB
ALBUMIN SERPL ELPH-MCNC: 3.6 G/DL — SIGNIFICANT CHANGE UP (ref 3.3–5)
ALP SERPL-CCNC: 184 U/L — HIGH (ref 40–120)
ALT FLD-CCNC: 10 U/L — SIGNIFICANT CHANGE UP (ref 4–33)
ANION GAP SERPL CALC-SCNC: 14 MMOL/L — SIGNIFICANT CHANGE UP (ref 7–14)
AST SERPL-CCNC: 13 U/L — SIGNIFICANT CHANGE UP (ref 4–32)
BASOPHILS # BLD AUTO: 0.04 K/UL — SIGNIFICANT CHANGE UP (ref 0–0.2)
BASOPHILS NFR BLD AUTO: 0.4 % — SIGNIFICANT CHANGE UP (ref 0–2)
BILIRUB SERPL-MCNC: <0.2 MG/DL — SIGNIFICANT CHANGE UP (ref 0.2–1.2)
BUN SERPL-MCNC: 10 MG/DL — SIGNIFICANT CHANGE UP (ref 7–23)
CALCIUM SERPL-MCNC: 7.4 MG/DL — LOW (ref 8.4–10.5)
CHLORIDE SERPL-SCNC: 102 MMOL/L — SIGNIFICANT CHANGE UP (ref 98–107)
CO2 SERPL-SCNC: 19 MMOL/L — LOW (ref 22–31)
CREAT SERPL-MCNC: 0.52 MG/DL — SIGNIFICANT CHANGE UP (ref 0.5–1.3)
EGFR: 123 ML/MIN/1.73M2 — SIGNIFICANT CHANGE UP
EGFR: 123 ML/MIN/1.73M2 — SIGNIFICANT CHANGE UP
EOSINOPHIL # BLD AUTO: 0.2 K/UL — SIGNIFICANT CHANGE UP (ref 0–0.5)
EOSINOPHIL NFR BLD AUTO: 1.9 % — SIGNIFICANT CHANGE UP (ref 0–6)
GLUCOSE SERPL-MCNC: 102 MG/DL — HIGH (ref 70–99)
HCT VFR BLD CALC: 35.7 % — SIGNIFICANT CHANGE UP (ref 34.5–45)
HGB BLD-MCNC: 11.8 G/DL — SIGNIFICANT CHANGE UP (ref 11.5–15.5)
IMM GRANULOCYTES # BLD AUTO: 0.03 K/UL — SIGNIFICANT CHANGE UP (ref 0–0.07)
IMM GRANULOCYTES NFR BLD AUTO: 0.3 % — SIGNIFICANT CHANGE UP (ref 0–0.9)
LDH SERPL L TO P-CCNC: 159 U/L — SIGNIFICANT CHANGE UP (ref 135–225)
LYMPHOCYTES # BLD AUTO: 2.76 K/UL — SIGNIFICANT CHANGE UP (ref 1–3.3)
LYMPHOCYTES NFR BLD AUTO: 25.7 % — SIGNIFICANT CHANGE UP (ref 13–44)
MAGNESIUM SERPL-MCNC: 4.7 MG/DL — HIGH (ref 1.6–2.6)
MCHC RBC-ENTMCNC: 31.1 PG — SIGNIFICANT CHANGE UP (ref 27–34)
MCHC RBC-ENTMCNC: 33.1 G/DL — SIGNIFICANT CHANGE UP (ref 32–36)
MCV RBC AUTO: 93.9 FL — SIGNIFICANT CHANGE UP (ref 80–100)
MONOCYTES # BLD AUTO: 0.67 K/UL — SIGNIFICANT CHANGE UP (ref 0–0.9)
MONOCYTES NFR BLD AUTO: 6.3 % — SIGNIFICANT CHANGE UP (ref 2–14)
NEUTROPHILS # BLD AUTO: 7.02 K/UL — SIGNIFICANT CHANGE UP (ref 1.8–7.4)
NEUTROPHILS NFR BLD AUTO: 65.4 % — SIGNIFICANT CHANGE UP (ref 43–77)
NRBC # BLD AUTO: 0 K/UL — SIGNIFICANT CHANGE UP (ref 0–0)
NRBC # FLD: 0 K/UL — SIGNIFICANT CHANGE UP (ref 0–0)
NRBC BLD AUTO-RTO: 0 /100 WBCS — SIGNIFICANT CHANGE UP (ref 0–0)
PLATELET # BLD AUTO: 339 K/UL — SIGNIFICANT CHANGE UP (ref 150–400)
PMV BLD: 9.1 FL — SIGNIFICANT CHANGE UP (ref 7–13)
POTASSIUM SERPL-MCNC: 4.5 MMOL/L — SIGNIFICANT CHANGE UP (ref 3.5–5.3)
POTASSIUM SERPL-SCNC: 4.5 MMOL/L — SIGNIFICANT CHANGE UP (ref 3.5–5.3)
PROT SERPL-MCNC: 7 G/DL — SIGNIFICANT CHANGE UP (ref 6–8.3)
RBC # BLD: 3.8 M/UL — SIGNIFICANT CHANGE UP (ref 3.8–5.2)
RBC # FLD: 15.3 % — HIGH (ref 10.3–14.5)
SODIUM SERPL-SCNC: 135 MMOL/L — SIGNIFICANT CHANGE UP (ref 135–145)
URATE SERPL-MCNC: 5.5 MG/DL — SIGNIFICANT CHANGE UP (ref 2.5–7)
WBC # BLD: 10.72 K/UL — HIGH (ref 3.8–10.5)
WBC # FLD AUTO: 10.72 K/UL — HIGH (ref 3.8–10.5)

## 2025-06-29 RX ORDER — IBUPROFEN 200 MG
600 TABLET ORAL EVERY 6 HOURS
Refills: 0 | Status: DISCONTINUED | OUTPATIENT
Start: 2025-06-29 | End: 2025-07-03

## 2025-06-29 RX ADMIN — Medication 975 MILLIGRAM(S): at 15:29

## 2025-06-29 RX ADMIN — Medication 975 MILLIGRAM(S): at 21:45

## 2025-06-29 RX ADMIN — Medication 975 MILLIGRAM(S): at 09:24

## 2025-06-29 RX ADMIN — Medication 975 MILLIGRAM(S): at 21:15

## 2025-06-29 RX ADMIN — Medication 975 MILLIGRAM(S): at 10:00

## 2025-06-29 RX ADMIN — Medication 60 MILLIGRAM(S): at 04:11

## 2025-06-29 RX ADMIN — Medication 3 MILLILITER(S): at 14:34

## 2025-06-29 RX ADMIN — Medication 975 MILLIGRAM(S): at 15:59

## 2025-06-29 RX ADMIN — Medication 50 GM/HR: at 07:42

## 2025-06-29 RX ADMIN — Medication 3 MILLILITER(S): at 22:00

## 2025-06-29 RX ADMIN — Medication 30 MILLIGRAM(S): at 15:29

## 2025-06-29 NOTE — PROGRESS NOTE ADULT - ASSESSMENT
A/P: 40yo PPD#1 s/p  c/b siPEC w/ SF.  Patient is stable and doing well post-partum.   #siPEC w/ SF  - BPs ON well controlled  - denies severe features   - baseline HELLP labs wnl; f/u AM HELLP labs  - recently uptitrated to Pro60/30 BID  - CardioOB consult as patient with minimal follow up in outpatient setting  - continue to monitor     #Postpartum state  - Pain well controlled, continue current pain regimen  - Increase ambulation, SCDs when not ambulating  - Continue regular diet  - SW consult in the setting of housing insecurity     Mae Rosario PGY2

## 2025-06-30 ENCOUNTER — TRANSCRIPTION ENCOUNTER (OUTPATIENT)
Age: 40
End: 2025-06-30

## 2025-06-30 PROCEDURE — 99232 SBSQ HOSP IP/OBS MODERATE 35: CPT

## 2025-06-30 RX ORDER — ASPIRIN 325 MG
1 TABLET ORAL
Refills: 0 | DISCHARGE

## 2025-06-30 RX ORDER — PRENATAL 136/IRON/FOLIC ACID 27 MG-1 MG
1 TABLET ORAL
Refills: 0 | DISCHARGE

## 2025-06-30 RX ORDER — NIFEDIPINE 30 MG
1 TABLET, EXTENDED RELEASE 24 HR ORAL
Qty: 30 | Refills: 0
Start: 2025-06-30

## 2025-06-30 RX ORDER — NIFEDIPINE 30 MG
1 TABLET, EXTENDED RELEASE 24 HR ORAL
Refills: 0 | DISCHARGE

## 2025-06-30 RX ADMIN — Medication 30 MILLIGRAM(S): at 15:34

## 2025-06-30 RX ADMIN — Medication 3 MILLILITER(S): at 14:48

## 2025-06-30 RX ADMIN — Medication 60 MILLIGRAM(S): at 03:32

## 2025-06-30 RX ADMIN — Medication 975 MILLIGRAM(S): at 20:10

## 2025-06-30 RX ADMIN — Medication 975 MILLIGRAM(S): at 20:40

## 2025-06-30 NOTE — DISCHARGE NOTE OB - CARE PROVIDER_API CALL
[facility],   [address] [place] [id number]  Phone: [phone]  Fax: (   )    -  Follow Up Time:    [address] [address]  Phone: [phone]  Fax: (   )    -  Follow Up Time:

## 2025-06-30 NOTE — DISCHARGE NOTE OB - MATERIALS PROVIDED
Vaccinations/[facility]  Screening Program/  Immunization Record/Breastfeeding Log/Breastfeeding Mother’s Support Group Information/Guide to Postpartum Care/[facility] Hearing Screen Program/Back To Sleep Handout/Shaken Baby Prevention Handout/Breastfeeding Guide and Packet/Birth Certificate Instructions/Discharge Medication Information for Patients and Families Pocket Guide/Letter of Medical Neccessity

## 2025-06-30 NOTE — DISCHARGE NOTE OB - FINDINGS/TREATMENT
Make your follow-up appointment with your doctor in 3 days for a blood pressure check and in 6 weeks for a post-partum check. No heavy lifting, driving, or strenuous activity for 6 weeks. Nothing per vagina such as tampons, intercourse, douches, or tub baths for 6 weeks or until you see your doctor. Call your doctor with any signs and symptoms of infection such as fever, chills, nausea, or vomiting. Call your doctor if you're unable to tolerate food, increase in vaginal bleeding, or have difficulty urinating. Call your doctor if you have pain that is not relieved by your prescribed medications. Notify your doctor with any other concerns.   Call   if you have any of these concerns in the next 6 weeks.

## 2025-06-30 NOTE — DISCHARGE NOTE OB - MEDICATION SUMMARY - MEDICATIONS TO STOP TAKING
I will STOP taking the medications listed below when I get home from the hospital:    NIFEdipine (Eqv-Adalat CC) 30 mg oral tablet, extended release  -- 1 tab(s) by mouth 2 times a day    aspirin 81 mg oral tablet  -- 1 tab(s) by mouth once a day   I will STOP taking the medications listed below when I get home from the hospital:    Procardia XL 30 mg oral tablet, extended release  -- 1 tab(s) by mouth once a day (in the afternoon)    NIFEdipine (Eqv-Adalat CC) 30 mg oral tablet, extended release  -- 1 tab(s) by mouth 2 times a day    aspirin 81 mg oral tablet  -- 1 tab(s) by mouth once a day

## 2025-06-30 NOTE — DISCHARGE NOTE OB - PROVIDER TOKENS
FREE:[LAST:[[facility]],PHONE:[phone]],FAX:[(   )    -],ADDRESS:[[address] [address]]] FREE:[LAST:[[facility]PHONE:[phone]],FAX:[(   )    -],ADDRESS:[[address] [place] [address]]]

## 2025-06-30 NOTE — PROGRESS NOTE ADULT - ASSESSMENT
Problem: Nutritional:  Goal: Achieve adequate nutritional intake  Description: Patient will consume ~50% of meals/supplements   Outcome: PROGRESSING AS EXPECTED   Per ADL documentation PO intake has progressed well over the past several days, consumed % of 2 meals on both 8/2 and 8/3. RD to continue monitoring for consistency.    A/P: 40yo PPD#2 s/p  c/b siPEC w/ SF.  Patient is stable and doing well post-partum.   #siPEC w/ SF  - BPs ON well controlled  - denies severe features   - baseline HELLP labs wnl; stable on repeat  - recently uptitrated to Pro60/30 BID, BP controlled overnight  - CardioOB consult as patient with minimal follow up in outpatient setting  - continue to monitor     #Postpartum state  - Pain well controlled, continue current pain regimen  - Increase ambulation, SCDs when not ambulating  - Continue regular diet  - SW consult in the setting of housing insecurity   - Male infant, desires circumcision  - Declines birth control    ROBBIE Underwood PGY-2     A/P: 38yo PPD#2 s/p  c/b siPEC w/ SF.  Patient is stable and doing well post-partum.   #siPEC w/ SF  - BPs ON well controlled  - denies severe features   - baseline HELLP labs wnl; stable on repeat  - recently uptitrated to Pro60/30 BID, BP controlled overnight  - CardioOB consult as patient with minimal follow up in outpatient setting  - continue to monitor     #Postpartum state  - Pain well controlled, continue current pain regimen  - Increase ambulation, SCDs when not ambulating  - Continue regular diet  - SW consult in the setting of housing insecurity   - Male infant, undecided regarding circumcision; will provide outpatient information upon discharge  - Declines birth control  - Discharge planning today    ROBBIE Underwood PGY-2

## 2025-06-30 NOTE — DISCHARGE NOTE OB - PLAN OF CARE
to recover from vaginal delivery Make your follow-up appointment with your doctor in 3 days for a blood pressure check and in 6 weeks for a post-partum check. No heavy lifting, driving, or strenuous activity for 6 weeks. Nothing per vagina such as tampons, intercourse, douches, or tub baths for 6 weeks or until you see your doctor. Call your doctor with any signs and symptoms of infection such as fever, chills, nausea, or vomiting. Call your doctor if you're unable to tolerate food, increase in vaginal bleeding, or have difficulty urinating. Call your doctor if you have pain that is not relieved by your prescribed medications. Notify your doctor with any other concerns.   Call   if you have any of these concerns in the next 6 weeks. Please measure your blood pressure 3 times a day. Please call for persistent blood pressure >= 140/90, or any >= 160/110. Please call for persistent headaches, vision changes, right upper abdominal pain. Please follow up with Cardiology OB as directed.

## 2025-06-30 NOTE — DISCHARGE NOTE OB - HOSPITAL COURSE
Patient was admitted to L+D for , Pt had an uncomplicated  followed by an uncomplicated postpartum course. C/b siPEC with severe features. Controlled on antihypertensives. Plan for cardio OB outpatient telehealth. Evaluated by social work.  On Postpartum day 2, patient was discharged home in stable condition, voiding spontaneously, pain well controlled, ambulating, tolerating PO and with normal vital signs.  Pt plans to follow up in the [facility] in 6 weeks. Telephone number and clinic information provided prior to discharge.  Patient was admitted to L+D for , Pt had an uncomplicated  followed by an uncomplicated postpartum course. C/b siPEC with severe features. Controlled on antihypertensives. Plan for cardio OB outpatient telehealth. Evaluated by social work.  On Postpartum day 2, patient was discharged home in stable condition, voiding spontaneously, pain well controlled, ambulating, tolerating PO and with normal vital signs.  Pt plans to follow up at [facility] for blood pressure check in 3 days and for postpartum visit in 6 weeks. Telephone number and clinic information provided prior to discharge.  Patient was admitted to L+D for , Pt had an uncomplicated  followed by an uncomplicated postpartum course. C/b siPEC with severe features. Controlled on antihypertensives. Plan for cardio OB outpatient telehealth. Evaluated by social work.  On Postpartum day 3, patient was discharged home in stable condition, voiding spontaneously, pain well controlled, ambulating, tolerating PO and with normal vital signs.  Pt plans to follow up at [facility] for blood pressure check in 3 days and for postpartum visit in 6 weeks. Telephone number and clinic information provided prior to discharge.  Patient was admitted to L+D for , Pt had an uncomplicated  followed by an uncomplicated postpartum course. C/b siPEC with severe features. Controlled on antihypertensives, uptitrated. Plan for cardio OB outpatient telehealth. Evaluated by social work.  On Postpartum day 4, patient was discharged home in stable condition, voiding spontaneously, pain well controlled, ambulating, tolerating PO and with normal vital signs.  Pt plans to follow up at [facility] for blood pressure check in 3 days and for postpartum visit in 6 weeks. Telephone number and clinic information provided prior to discharge. Declines contraception Patient was admitted to L+D for , Pt had an uncomplicated  followed by an uncomplicated postpartum course. C/b siPEC with severe features. Controlled on antihypertensives, uptitrated. Plan for cardio OB outpatient telehealth. Evaluated by social work.  On Postpartum day 4, patient was discharged home in stable condition, voiding spontaneously, pain well controlled, ambulating, tolerating PO and with normal vital signs.  Pt plans to follow up at [facility] for blood pressure check in 3 days and for postpartum visit in 6 weeks. Telephone number and clinic information provided prior to discharge. Declines contraception. Patient to be discharged home with home care for blood pressure monitoring.

## 2025-06-30 NOTE — DISCHARGE NOTE OB - CARE PLAN
Assessment and plan of treatment:	to recover from vaginal delivery   Principal Discharge DX:	Vaginal delivery  Assessment and plan of treatment:	Make your follow-up appointment with your doctor in 3 days for a blood pressure check and in 6 weeks for a post-partum check. No heavy lifting, driving, or strenuous activity for 6 weeks. Nothing per vagina such as tampons, intercourse, douches, or tub baths for 6 weeks or until you see your doctor. Call your doctor with any signs and symptoms of infection such as fever, chills, nausea, or vomiting. Call your doctor if you're unable to tolerate food, increase in vaginal bleeding, or have difficulty urinating. Call your doctor if you have pain that is not relieved by your prescribed medications. Notify your doctor with any other concerns.   Call   if you have any of these concerns in the next 6 weeks.   1 Principal Discharge DX:	Vaginal delivery  Assessment and plan of treatment:	Make your follow-up appointment with your doctor in 3 days for a blood pressure check and in 6 weeks for a post-partum check. No heavy lifting, driving, or strenuous activity for 6 weeks. Nothing per vagina such as tampons, intercourse, douches, or tub baths for 6 weeks or until you see your doctor. Call your doctor with any signs and symptoms of infection such as fever, chills, nausea, or vomiting. Call your doctor if you're unable to tolerate food, increase in vaginal bleeding, or have difficulty urinating. Call your doctor if you have pain that is not relieved by your prescribed medications. Notify your doctor with any other concerns.   Call   if you have any of these concerns in the next 6 weeks.  Assessment and plan of treatment:	Please measure your blood pressure 3 times a day. Please call for persistent blood pressure >= 140/90, or any >= 160/110. Please call for persistent headaches, vision changes, right upper abdominal pain. Please follow up with Cardiology OB as directed.

## 2025-06-30 NOTE — CONSULT NOTE ADULT - ASSESSMENT
--Would continue 60 am an d 30 mg pm of Nifedipine, instructed Patient that she can take an extra 30 mg when she is discharged if BPs are between 145-160/ and to return to the ER if there is no improvement or if she has symptoms or ranges > 160/100  --My Office will contact her tomorrow to arrange for a telehealth visit next week

## 2025-06-30 NOTE — CONSULT NOTE ADULT - SUBJECTIVE AND OBJECTIVE BOX
38yo F PPD#2 s/p  c/b siPEC. Patient states this is her fourth baby, she has had PEC for most of her pregnancies, had been on 30 mg early on in this Pregnancy. She was not on antihypertensives before pregnancy. Today, she feels well, denies chest pain, shortness of breath, palpitations, edema.  She has had intermittent high range BPs necessitating titrating up Nifedipine to 60/30 mg.      O:  T(C): 36.7 (25 @ 15:30), Max: 36.8 (25 @ 22:44)  HR: 83 (25 @ 15:30) (72 - 88)  BP: 146/88 (25 @ 15:30) (110/68 - 149/91)  RR: 18 (25 @ 15:30) (17 - 18)  SpO2: 99% (25 @ 15:30) (97% - 100%)      O/E:  Gen: NAD  HEENT: EOMI  CV: RRR, normal S1 + S2, no m/r/g  Lungs: CTAB  Abd: soft, non-tender  Ext: No edema    Labs:                        11.8   10.72 )-----------( 339      ( 2025 09:03 )             35.7         135  |  102  |  10  ----------------------------<  102[H]  4.5   |  19[L]  |  0.52    Ca    7.4[L]      2025 09:15  Mg     4.70         TPro  7.0  /  Alb  3.6  /  TBili  <0.2  /  DBili  x   /  AST  13  /  ALT  10  /  AlkPhos  184[H]            Meds:  MEDICATIONS  (STANDING):  acetaminophen     Tablet .. 975 milliGRAM(s) Oral <User Schedule>  ceFAZolin   IVPB 2000 milliGRAM(s) IV Intermittent once  diphtheria/tetanus/pertussis (acellular) Vaccine (Adacel) 0.5 milliLiter(s) IntraMuscular once  ibuprofen  Tablet. 600 milliGRAM(s) Oral every 6 hours  ketorolac   Injectable 30 milliGRAM(s) IV Push once  NIFEdipine XL 60 milliGRAM(s) Oral <User Schedule>  NIFEdipine XL 30 milliGRAM(s) Oral <User Schedule>  prenatal multivitamin 1 Tablet(s) Oral daily  sodium chloride 0.9% lock flush 3 milliLiter(s) IV Push every 8 hours

## 2025-06-30 NOTE — PROVIDER CONTACT NOTE (CHANGE IN STATUS NOTIFICATION) - SITUATION
PT IS FOR DISCHARGE,on procardia ,@ 1400 v/s 149/91 ,remain asymptomatic, rpted v/s@15.30 /88 .pt denies any head ache,blurry vision or epigastric discomfort.due procardia 30 XL given

## 2025-06-30 NOTE — PROGRESS NOTE ADULT - ATTENDING COMMENTS
Pt seen and examined at bedside. Agree with the documentation as written, with changes made as necessary.    39y  PPD1 s/p uncomplicated . Pregnancy c/b cHTN superimposed preE with SF, social situation (previously in shelter, wasn't taking BP medications)     Recovering well. Pain controlled. Lochia appropriate. Pumping, low supply. Tolerating po, voiding, passing flatus, ambulating w/o issue. Denies fevers, headaches, vision changes, RUQ pain. Baby doing well in NICU.     Vital Signs Last 24 Hrs  T(C): 36.9 (2025 09:00), Max: 37.4 (2025 22:10)  T(F): 98.4 (2025 09:00), Max: 99.4 (2025 22:10)  HR: 81 (2025 09:00) (72 - 97)  BP: 116/71 (2025 09:00) (108/63 - 157/84)  BP(mean): 91 (2025 00:15) (85 - 103)  RR: 18 (2025 09:00) (16 - 18)  SpO2: 98% (2025 09:00) (87% - 100%)    Parameters below as of 2025 06:15  Patient On (Oxygen Delivery Method): room air    Gen: NAD  CV: regular rate  Lung: unlabored   Abd: post-gravid, soft, nontender, nondistended, fundus UU  Ext: no calf tenderness, trace edema  Neuro: alert                           11.8   10.72 )-----------( 339      ( 2025 09:03 )             35.7       A/P:  cHTN Pre-eclampsia with SF  -intermittent mild BPs overnight  -currently on magnesium  -AM CBC wnl, remainder of HELLP labs pending  -on Procardia 60mg qAM, 60mg qPM, consider uptitrating if mild BPs continue to persist  -will consult Cardio-OB while inpatient given has not seen Cardiology during pregnancy, per pt has had cHTN since age 19, to establish and for discharge planning    PP care  -meeting pp milestones  -lactation available  -hgb 12.3 > 11.8, appropriate drop  -contraception declines  -continue routine pp care  -desires circ for   -SW consulted for social situation  -anticipate DC PPD2    ART New MD
OB Attending Note    Attempted to see patient.   Patient in NICU upon my arrival to room.   Patient sp SW consult w/ disposition to home.   sp cardioOB cnosult with no changed needed to current regimen.  Ok for d/c home.     ALEXIS Michel MD

## 2025-06-30 NOTE — DISCHARGE NOTE OB - PATIENT PORTAL LINK FT
You can access the FollowMyHealth Patient Portal offered by [facility] by registering at the following website: [email or web address]. By joining [email or web address]’s FollowMyHealth portal, you will also be able to view your health information using other applications (apps) compatible with our system.

## 2025-06-30 NOTE — DISCHARGE NOTE OB - COMMUNITY RESOURCE NAME:
MAS transportation:[phone]  [phone]  patient 06/30/2025 at 3:30 pm from [facility] and transport to home address confirmed as [address] [place] [address] MAS transportation:[phone]  [facility] [phone] to  patient MAS transportation:[phone]  [facility] [phone] to  patient at 8pm

## 2025-06-30 NOTE — DISCHARGE NOTE OB - MEDICATION SUMMARY - MEDICATIONS TO TAKE
I will START or STAY ON the medications listed below when I get home from the hospital:    Blood pressure cuff  -- Take your BP 3 times daily  -- Indication: For htn    Procardia XL 30 mg oral tablet, extended release  -- 1 tab(s) by mouth once a day (in the afternoon)  -- Indication: For htn    Procardia XL 60 mg oral tablet, extended release  -- 1 tab(s) by mouth once a day (in the morning)  -- Indication: For htn   I will START or STAY ON the medications listed below when I get home from the hospital:    Blood pressure cuff  -- Take your BP 3 times daily  -- Indication: For Chronic hypertension    Procardia XL 60 mg oral tablet, extended release  -- 1 tab(s) by mouth every 12 hours  -- Indication: For Chronic hypertension

## 2025-06-30 NOTE — DISCHARGE NOTE OB - FINANCIAL ASSISTANCE
[facility] provides services at a reduced cost to those who are determined to be eligible through [facility]’s financial assistance program. Information regarding [facility]’s financial assistance program can be found by going to [email or web address] or by calling [phone].

## 2025-07-01 PROBLEM — I10 ESSENTIAL (PRIMARY) HYPERTENSION: Chronic | Status: ACTIVE | Noted: 2025-06-28

## 2025-07-01 RX ORDER — NIFEDIPINE 30 MG
30 TABLET, EXTENDED RELEASE 24 HR ORAL ONCE
Refills: 0 | Status: COMPLETED | OUTPATIENT
Start: 2025-07-01 | End: 2025-07-01

## 2025-07-01 RX ORDER — NIFEDIPINE 30 MG
60 TABLET, EXTENDED RELEASE 24 HR ORAL EVERY 12 HOURS
Refills: 0 | Status: DISCONTINUED | OUTPATIENT
Start: 2025-07-02 | End: 2025-07-03

## 2025-07-01 RX ORDER — NIFEDIPINE 30 MG
60 TABLET, EXTENDED RELEASE 24 HR ORAL
Refills: 0 | Status: DISCONTINUED | OUTPATIENT
Start: 2025-07-01 | End: 2025-07-01

## 2025-07-01 RX ADMIN — Medication 30 MILLIGRAM(S): at 21:15

## 2025-07-01 RX ADMIN — Medication 3 MILLILITER(S): at 21:48

## 2025-07-01 RX ADMIN — Medication 30 MILLIGRAM(S): at 15:21

## 2025-07-01 RX ADMIN — Medication 60 MILLIGRAM(S): at 04:21

## 2025-07-01 NOTE — PROGRESS NOTE ADULT - ASSESSMENT
A/P: 38yo PPD#2 s/p .  Patient is stable and doing well post-partum.      #siPEC w/ SF  - BPs ON well controlled  - denies severe features   - baseline HELLP labs wnl; stable on repeat  - recently uptitrated to Pro60/30 BID, BP controlled overnight  - CardioOB consult as patient with minimal follow up in outpatient setting  - Patient has BP cuff delivered yesterday  - continue to monitor       #Postpartum state  - Pain well controlled, continue current pain regimen  - Increase ambulation, SCDs when not ambulating  - Continue regular diet  - SW consult in the setting of housing insecurity   - Male infant, undecided regarding circumcision; will provide outpatient information upon discharge  - Declines birth control  - Discharge planning today    Angela Bonilla, PGY1   A/P: 40yo PPD#3 s/p .  Patient is stable and doing well post-partum.      #siPEC w/ SF  - BPs ON well controlled  - denies severe features   - baseline HELLP labs wnl; stable on repeat  - recently uptitrated to Pro60/30 BID, BP controlled overnight  - CardioOB consult as patient with minimal follow up in outpatient setting  - Patient has BP cuff delivered yesterday  - continue to monitor       #Postpartum state  - Pain well controlled, continue current pain regimen  - Increase ambulation, SCDs when not ambulating  - Continue regular diet  - SW consult in the setting of housing insecurity   - Male infant, undecided regarding circumcision; will provide outpatient information upon discharge  - Declines birth control  - Discharge planning today    Angela Bonilla, PGY1    OB attending  pt seen and agree with above  stable for d/c home today  AIMEE Rodriguez MD

## 2025-07-01 NOTE — PROVIDER CONTACT NOTE (OTHER) - SITUATION
Pt ready for DC  /96
PT is a  superimposed preclamptic on magnesium pt had to repeat blood pressure 15 min apart

## 2025-07-01 NOTE — CHART NOTE - NSCHARTNOTEFT_GEN_A_CORE
Pt with persistent 140/80-90s on discharge vital signs. Currently, asymptomatic however increase from prior baseline in 110/60s. Given concern for elevated blood pressures patient to stay for additional day for blood pressure monitoring and possible uptitration of blood pressure medications.    T(C): 36.7 (30 Jun 2025 15:30), Max: 36.8 (29 Jun 2025 22:44)  T(F): 98 (30 Jun 2025 15:30), Max: 98.3 (30 Jun 2025 01:34)  HR: 83 (30 Jun 2025 15:30) (72 - 88)  BP: 146/88 (30 Jun 2025 15:30) (110/68 - 149/91)  BP(mean): --  ABP: --  ABP(mean): --  RR: 18 (30 Jun 2025 15:30) (17 - 18)  SpO2: 99% (30 Jun 2025 15:30) (97% - 100%)    O2 Parameters below as of 30 Jun 2025 14:15  Patient On (Oxygen Delivery Method): room air    d/w Dr. Anand Underwood PGY-3
Post Partum     Team member notified of /89, all other vitals stable and patient asymptomatic Patient currently taking Pro60/30. Cards OB team notified and confirmed previous recommendations to take additional 30mg procardia at home if BP elevated on checks at home. Patient aware of plan and has BP cuff at bedside, aware to log BPs at home. Telehealth visit with Cards OB team this Thursday. Plan discussed with attending.     Angela Bonilla  Obstetrics and Gynecology, PGY-1

## 2025-07-02 VITALS
TEMPERATURE: 98 F | RESPIRATION RATE: 17 BRPM | DIASTOLIC BLOOD PRESSURE: 86 MMHG | OXYGEN SATURATION: 100 % | HEART RATE: 86 BPM | SYSTOLIC BLOOD PRESSURE: 137 MMHG

## 2025-07-02 RX ORDER — NIFEDIPINE 30 MG
1 TABLET, EXTENDED RELEASE 24 HR ORAL
Qty: 60 | Refills: 0
Start: 2025-07-02 | End: 2025-07-31

## 2025-07-02 RX ADMIN — Medication 3 MILLILITER(S): at 05:02

## 2025-07-02 RX ADMIN — Medication 60 MILLIGRAM(S): at 10:47

## 2025-07-02 NOTE — PROGRESS NOTE ADULT - SUBJECTIVE AND OBJECTIVE BOX
39y  PPD#4 s/p uncomplicated . Pregnancy c/b cHTN superimposed pre-eclampsia with SF.     Recovering well. Pain controlled. Lochia appropriate. Pumping/formula. Tolerating po, voiding, passing flatus, ambulating w/o issue. Denies fevers, headaches, vision changes, RUQ pain. Baby doing well.    Vital Signs Last 24 Hrs  T(C): 36.6 (2025 05:14), Max: 37.1 (2025 17:07)  T(F): 97.9 (2025 05:14), Max: 98.7 (2025 17:07)  HR: 63 (2025 05:14) (63 - 100)  BP: 132/77 (2025 05:14) (110/67 - 153/96)  BP(mean): --  RR: 16 (2025 05:14) (16 - 19)  SpO2: 99% (2025 05:14) (99% - 100%)    Parameters below as of 2025 05:14  Patient On (Oxygen Delivery Method): room air    Gen: NAD  CV: regular rate  Lung: unlabored   Abd: post-gravid, soft, nontender, nondistended, fundus UU  Ext: no calf tenderness, trace edema  Neuro: alert     A/P:  cHTN SI PreE with SF  -mild BPs yesterday, normal overnight   -Procardia 60mg BID XL, uptitrated last night  -s/p magnesium  -HELLP labs stable  -Cardio-OB consulted, appreciate, has telehealth follow up tomorrow    PP care  -meeting pp milestones  -lactation available  -hgb 12.3 > 11.8, appropriate drop  -contraception declines, discussed consistent condom use to avoid short interval pregnancy  -continue routine pp care  -S/p SW consult  -anticipate DC PPD4 or later pending BPs later today, to follow up with [facility] 
OB Progress Note:  PPD#1    S: 40yo  PPD#1 s/p  c/b siPEC w/ SF. Patient feels well. Pain is well controlled. She is tolerating a regular diet and passing flatus. She is voiding spontaneously, and ambulating without difficulty. Denies CP/SOB. Denies lightheadedness/dizziness. Denies N/V.    O:  Vitals:  Vital Signs Last 24 Hrs  T(C): 37.1 (2025 06:15), Max: 37.4 (2025 22:10)  T(F): 98.7 (2025 06:15), Max: 99.4 (2025 22:10)  HR: 76 (2025 06:15) (72 - 97)  BP: 108/63 (2025 06:15) (108/63 - 181/97)  BP(mean): 91 (2025 00:15) (85 - 103)  RR: 18 (2025 06:15) (16 - 18)  SpO2: 96% (2025 06:15) (87% - 100%)    Parameters below as of 2025 06:15  Patient On (Oxygen Delivery Method): room air        MEDICATIONS  (STANDING):  acetaminophen     Tablet .. 975 milliGRAM(s) Oral <User Schedule>  ceFAZolin   IVPB 2000 milliGRAM(s) IV Intermittent once  diphtheria/tetanus/pertussis (acellular) Vaccine (Adacel) 0.5 milliLiter(s) IntraMuscular once  ibuprofen  Tablet. 600 milliGRAM(s) Oral every 6 hours  ketorolac   Injectable 30 milliGRAM(s) IV Push once  lactated ringers. 1000 milliLiter(s) (50 mL/Hr) IV Continuous <Continuous>  NIFEdipine XL 60 milliGRAM(s) Oral <User Schedule>  NIFEdipine XL 30 milliGRAM(s) Oral <User Schedule>  oxytocin Infusion 167 milliUNIT(s)/Min (167 mL/Hr) IV Continuous <Continuous>  oxytocin Infusion. 2 milliUNIT(s)/Min (2 mL/Hr) IV Continuous <Continuous>  prenatal multivitamin 1 Tablet(s) Oral daily  sodium chloride 0.9% lock flush 3 milliLiter(s) IV Push every 8 hours      Labs:  Blood type: A Positive  Rubella IgG: RPR: Negative                          12.3   10.45 >-----------< 342    (  @ 04:30 )             37.5    25 @ 04:30      135  |  103  |  8   ----------------------------<  92  4.0   |  17[L]  |  0.55        Ca    9.0      2025 04:30  Mg     4.70[H]       Mg     4.60[H]       Mg     4.50[H]       Mg     4.10[H]         TPro  7.9  /  Alb  4.0  /  TBili  <0.2  /  DBili  x   /  AST  14  /  ALT  9   /  AlkPhos  232[H]  25 @ 04:30          Physical Exam:  General: NAD  Abdomen: soft, non-tender, non-distended, fundus firm  Vaginal: Lochia wnl  Extremities: No erythema/edema    
OB Progress Note:  PPD#2    S: 38yo PPD#3 s/p . Patient feels well. Pain is well controlled. She is tolerating a regular diet and passing flatus and had a BM yesterday. She is voiding spontaneously, and ambulating without difficulty. Denies CP/SOB. Denies lightheadedness/dizziness, headache, vision changes, epigastric pain. Denies N/V.    O:  Vitals:   Vital Signs Last 24 Hrs  T(C): 36.4 (2025 06:10), Max: 36.8 (2025 09:34)  T(F): 97.5 (2025 06:10), Max: 98.3 (2025 09:34)  HR: 70 (2025 06:10) (70 - 88)  BP: 120/67 (2025 06:10) (119/71 - 149/91)  BP(mean): --  RR: 18 (2025 06:10) (17 - 19)  SpO2: 100% (2025 06:10) (99% - 100%)    Parameters below as of 2025 06:10  Patient On (Oxygen Delivery Method): room air        MEDICATIONS  (STANDING):  acetaminophen     Tablet .. 975 milliGRAM(s) Oral <User Schedule>  ceFAZolin   IVPB 2000 milliGRAM(s) IV Intermittent once  diphtheria/tetanus/pertussis (acellular) Vaccine (Adacel) 0.5 milliLiter(s) IntraMuscular once  ibuprofen  Tablet. 600 milliGRAM(s) Oral every 6 hours  ketorolac   Injectable 30 milliGRAM(s) IV Push once  NIFEdipine XL 60 milliGRAM(s) Oral <User Schedule>  NIFEdipine XL 30 milliGRAM(s) Oral <User Schedule>  prenatal multivitamin 1 Tablet(s) Oral daily  sodium chloride 0.9% lock flush 3 milliLiter(s) IV Push every 8 hours    MEDICATIONS  (PRN):  benzocaine 20%/menthol 0.5% Spray 1 Spray(s) Topical every 6 hours PRN for Perineal discomfort  dibucaine 1% Ointment 1 Application(s) Topical every 6 hours PRN Perineal discomfort  diphenhydrAMINE 25 milliGRAM(s) Oral every 6 hours PRN Pruritus  hydrocortisone 1% Cream 1 Application(s) Topical every 6 hours PRN Moderate Pain (4-6)  lanolin Ointment 1 Application(s) Topical every 6 hours PRN nipple soreness  magnesium hydroxide Suspension 30 milliLiter(s) Oral two times a day PRN Constipation  oxyCODONE    IR 5 milliGRAM(s) Oral every 3 hours PRN Moderate to Severe Pain (4-10)  oxyCODONE    IR 5 milliGRAM(s) Oral once PRN Moderate to Severe Pain (4-10)  pramoxine 1%/zinc 5% Cream 1 Application(s) Topical every 4 hours PRN Moderate Pain (4-6)  simethicone 80 milliGRAM(s) Chew every 4 hours PRN Gas  witch hazel Pads 1 Application(s) Topical every 4 hours PRN Perineal discomfort      Labs:  Blood type: A Positive  Rubella IgG: RPR: Negative                          11.8   10.72[H] >-----------< 339    (  @ 09:03 )             35.7    25 @ 09:15      135  |  102  |  10  ----------------------------<  102[H]  4.5   |  19[L]  |  0.52        Ca    7.4[L]      2025 09:15  Mg     4.70[H]     06-29  Mg     4.60[H]     06-28  Mg     4.50[H]     06-28  Mg     4.10[H]     06-28    TPro  7.0  /  Alb  3.6  /  TBili  <0.2  /  DBili  x   /  AST  13  /  ALT  10  /  AlkPhos  184[H]  25 @ 09:15          Physical Exam:  General: NAD  Abdomen: soft, non-tender, non-distended, fundus firm  Vaginal: Lochia wnl  Extremities: No erythema/edema  
Patient seen and examined at bedside, no acute overnight events. No acute complaints, pain well controlled. Patient is ambulating, voiding spontaneously, passing gas, and tolerating regular diet. Denies CP, SOB, N/V, HA, blurred vision, epigastric pain.    Vital Signs Last 24 Hours  T(C): 36.6 (06-30-25 @ 03:30), Max: 37.1 (06-29-25 @ 06:15)  HR: 72 (06-30-25 @ 03:30) (72 - 88)  BP: 116/69 (06-30-25 @ 03:30) (108/63 - 127/83)  RR: 18 (06-30-25 @ 03:30) (17 - 18)  SpO2: 100% (06-30-25 @ 03:30) (96% - 100%)    Physical Exam:  General: NAD  Abdomen: Soft, non-tender, non-distended, fundus firm  Pelvic: Lochia wnl    Labs:    Blood Type: A Positive  Antibody Screen: Negative  RPR: Negative               11.8   10.72 )-----------( 339      ( 06-29 @ 09:03 )             35.7                12.3   10.45 )-----------( 342      ( 06-28 @ 04:30 )             37.5         MEDICATIONS  (STANDING):  acetaminophen     Tablet .. 975 milliGRAM(s) Oral <User Schedule>  ceFAZolin   IVPB 2000 milliGRAM(s) IV Intermittent once  diphtheria/tetanus/pertussis (acellular) Vaccine (Adacel) 0.5 milliLiter(s) IntraMuscular once  ibuprofen  Tablet. 600 milliGRAM(s) Oral every 6 hours  ketorolac   Injectable 30 milliGRAM(s) IV Push once  NIFEdipine XL 60 milliGRAM(s) Oral <User Schedule>  NIFEdipine XL 30 milliGRAM(s) Oral <User Schedule>  prenatal multivitamin 1 Tablet(s) Oral daily  sodium chloride 0.9% lock flush 3 milliLiter(s) IV Push every 8 hours    MEDICATIONS  (PRN):  benzocaine 20%/menthol 0.5% Spray 1 Spray(s) Topical every 6 hours PRN for Perineal discomfort  dibucaine 1% Ointment 1 Application(s) Topical every 6 hours PRN Perineal discomfort  diphenhydrAMINE 25 milliGRAM(s) Oral every 6 hours PRN Pruritus  hydrocortisone 1% Cream 1 Application(s) Topical every 6 hours PRN Moderate Pain (4-6)  lanolin Ointment 1 Application(s) Topical every 6 hours PRN nipple soreness  magnesium hydroxide Suspension 30 milliLiter(s) Oral two times a day PRN Constipation  oxyCODONE    IR 5 milliGRAM(s) Oral every 3 hours PRN Moderate to Severe Pain (4-10)  oxyCODONE    IR 5 milliGRAM(s) Oral once PRN Moderate to Severe Pain (4-10)  pramoxine 1%/zinc 5% Cream 1 Application(s) Topical every 4 hours PRN Moderate Pain (4-6)  simethicone 80 milliGRAM(s) Chew every 4 hours PRN Gas  witch hazel Pads 1 Application(s) Topical every 4 hours PRN Perineal discomfort

## 2025-07-03 ENCOUNTER — APPOINTMENT (OUTPATIENT)
Dept: CARDIOLOGY | Facility: CLINIC | Age: 40
End: 2025-07-03

## 2025-07-19 LAB — SURGICAL PATHOLOGY STUDY: SIGNIFICANT CHANGE UP

## 2025-08-20 ENCOUNTER — NON-APPOINTMENT (OUTPATIENT)
Age: 40
End: 2025-08-20